# Patient Record
Sex: FEMALE | Race: WHITE | NOT HISPANIC OR LATINO | Employment: OTHER | URBAN - METROPOLITAN AREA
[De-identification: names, ages, dates, MRNs, and addresses within clinical notes are randomized per-mention and may not be internally consistent; named-entity substitution may affect disease eponyms.]

---

## 2018-10-28 ENCOUNTER — PREP FOR SURGERY (OUTPATIENT)
Dept: OTHER | Facility: HOSPITAL | Age: 63
End: 2018-10-28

## 2018-10-28 DIAGNOSIS — Z80.0 FH: COLON CANCER: ICD-10-CM

## 2018-10-28 DIAGNOSIS — K63.5 COLON POLYP: Primary | ICD-10-CM

## 2018-11-08 PROBLEM — K63.5 COLON POLYP: Status: ACTIVE | Noted: 2018-11-08

## 2018-11-08 PROBLEM — Z80.0 FH: COLON CANCER: Status: ACTIVE | Noted: 2018-11-08

## 2018-11-27 ENCOUNTER — APPOINTMENT (OUTPATIENT)
Dept: WOMENS IMAGING | Facility: HOSPITAL | Age: 63
End: 2018-11-27

## 2018-11-27 PROCEDURE — 77067 SCR MAMMO BI INCL CAD: CPT | Performed by: RADIOLOGY

## 2018-11-27 PROCEDURE — 77063 BREAST TOMOSYNTHESIS BI: CPT | Performed by: RADIOLOGY

## 2018-12-05 ENCOUNTER — APPOINTMENT (OUTPATIENT)
Dept: WOMENS IMAGING | Facility: HOSPITAL | Age: 63
End: 2018-12-05

## 2018-12-05 PROCEDURE — MDREVIEWSP: Performed by: RADIOLOGY

## 2018-12-05 PROCEDURE — 77065 DX MAMMO INCL CAD UNI: CPT | Performed by: RADIOLOGY

## 2018-12-05 PROCEDURE — G0279 TOMOSYNTHESIS, MAMMO: HCPCS | Performed by: RADIOLOGY

## 2018-12-10 ENCOUNTER — APPOINTMENT (OUTPATIENT)
Dept: WOMENS IMAGING | Facility: HOSPITAL | Age: 63
End: 2018-12-10

## 2018-12-10 PROCEDURE — 19081 BX BREAST 1ST LESION STRTCTC: CPT | Performed by: RADIOLOGY

## 2019-01-29 ENCOUNTER — ANESTHESIA EVENT (OUTPATIENT)
Dept: GASTROENTEROLOGY | Facility: HOSPITAL | Age: 64
End: 2019-01-29

## 2019-01-29 ENCOUNTER — HOSPITAL ENCOUNTER (OUTPATIENT)
Facility: HOSPITAL | Age: 64
Setting detail: HOSPITAL OUTPATIENT SURGERY
Discharge: HOME | End: 2019-01-29
Attending: INTERNAL MEDICINE | Admitting: INTERNAL MEDICINE

## 2019-01-29 ENCOUNTER — ANESTHESIA (OUTPATIENT)
Dept: GASTROENTEROLOGY | Facility: HOSPITAL | Age: 64
End: 2019-01-29

## 2019-01-29 VITALS
SYSTOLIC BLOOD PRESSURE: 131 MMHG | HEART RATE: 61 BPM | OXYGEN SATURATION: 98 % | RESPIRATION RATE: 18 BRPM | WEIGHT: 209.31 LBS | BODY MASS INDEX: 32.85 KG/M2 | DIASTOLIC BLOOD PRESSURE: 74 MMHG | HEIGHT: 67 IN | TEMPERATURE: 98.1 F

## 2019-01-29 DIAGNOSIS — K63.5 COLON POLYP: ICD-10-CM

## 2019-01-29 DIAGNOSIS — Z80.0 FH: COLON CANCER: ICD-10-CM

## 2019-01-29 PROCEDURE — 25010000002 PROPOFOL 10 MG/ML EMULSION: Performed by: ANESTHESIOLOGY

## 2019-01-29 PROCEDURE — 45380 COLONOSCOPY AND BIOPSY: CPT | Performed by: INTERNAL MEDICINE

## 2019-01-29 PROCEDURE — 88305 TISSUE EXAM BY PATHOLOGIST: CPT | Performed by: INTERNAL MEDICINE

## 2019-01-29 RX ORDER — PROPOFOL 10 MG/ML
VIAL (ML) INTRAVENOUS AS NEEDED
Status: DISCONTINUED | OUTPATIENT
Start: 2019-01-29 | End: 2019-01-29 | Stop reason: SURG

## 2019-01-29 RX ORDER — LIDOCAINE HYDROCHLORIDE 20 MG/ML
INJECTION, SOLUTION INFILTRATION; PERINEURAL AS NEEDED
Status: DISCONTINUED | OUTPATIENT
Start: 2019-01-29 | End: 2019-01-29 | Stop reason: SURG

## 2019-01-29 RX ORDER — SODIUM CHLORIDE 0.9 % (FLUSH) 0.9 %
3 SYRINGE (ML) INJECTION AS NEEDED
Status: DISCONTINUED | OUTPATIENT
Start: 2019-01-29 | End: 2019-01-29 | Stop reason: HOSPADM

## 2019-01-29 RX ORDER — LIDOCAINE HYDROCHLORIDE 10 MG/ML
0.5 INJECTION, SOLUTION INFILTRATION; PERINEURAL ONCE AS NEEDED
Status: DISCONTINUED | OUTPATIENT
Start: 2019-01-29 | End: 2019-01-29 | Stop reason: HOSPADM

## 2019-01-29 RX ORDER — SODIUM CHLORIDE, SODIUM LACTATE, POTASSIUM CHLORIDE, CALCIUM CHLORIDE 600; 310; 30; 20 MG/100ML; MG/100ML; MG/100ML; MG/100ML
1000 INJECTION, SOLUTION INTRAVENOUS CONTINUOUS
Status: DISCONTINUED | OUTPATIENT
Start: 2019-01-29 | End: 2019-01-29 | Stop reason: HOSPADM

## 2019-01-29 RX ORDER — PROPOFOL 10 MG/ML
VIAL (ML) INTRAVENOUS CONTINUOUS PRN
Status: DISCONTINUED | OUTPATIENT
Start: 2019-01-29 | End: 2019-01-29 | Stop reason: SURG

## 2019-01-29 RX ADMIN — PROPOFOL 100 MCG/KG/MIN: 10 INJECTION, EMULSION INTRAVENOUS at 12:39

## 2019-01-29 RX ADMIN — LIDOCAINE HYDROCHLORIDE 60 MG: 20 INJECTION, SOLUTION INFILTRATION; PERINEURAL at 12:39

## 2019-01-29 RX ADMIN — SODIUM CHLORIDE, POTASSIUM CHLORIDE, SODIUM LACTATE AND CALCIUM CHLORIDE 1000 ML: 600; 310; 30; 20 INJECTION, SOLUTION INTRAVENOUS at 12:25

## 2019-01-29 RX ADMIN — PROPOFOL 50 MG: 10 INJECTION, EMULSION INTRAVENOUS at 12:39

## 2019-01-29 RX ADMIN — SODIUM CHLORIDE, POTASSIUM CHLORIDE, SODIUM LACTATE AND CALCIUM CHLORIDE 1000 ML: 600; 310; 30; 20 INJECTION, SOLUTION INTRAVENOUS at 11:48

## 2019-01-29 NOTE — ANESTHESIA POSTPROCEDURE EVALUATION
Patient: Coleen Zapien    Procedure Summary     Date:  01/29/19 Room / Location:   [facility]    Anesthesia Start:  1237 Anesthesia Stop:  1304    Procedure:  COLONOSCOPY TO CECUM WITH POLYPECTOMIES (N/A ) Diagnosis:       Colon polyp      FH: colon cancer      (Colon polyp [K63.5])      (FH: colon cancer [Z80.0])    Surgeon:  Clive Castro MD Provider:  Kari Fu MD    Anesthesia Type:  MAC ASA Status:  3          Anesthesia Type: MAC  Last vitals  BP   149/83 (01/29/19 1134)   Temp   36.7 °C (98.1 °F) (01/29/19 1134)   Pulse   65 (01/29/19 1134)   Resp   16 (01/29/19 1134)     SpO2   98 % (01/29/19 1134)     Post Anesthesia Care and Evaluation    Patient location during evaluation: bedside  Patient participation: complete - patient participated  Level of consciousness: awake  Pain management: adequate  Airway patency: patent  Anesthetic complications: No anesthetic complications    Cardiovascular status: acceptable  Respiratory status: acceptable  Hydration status: acceptable

## 2019-01-29 NOTE — H&P
"[facility]  Pre Procedure History & Physical    Chief Complaint:   62 yo female whose mom had colon cancer. She has a h/o colonic adenomas. She last had a colonoscopy in 8/16 when a 4 mm polyp was removed that was a leiomyoma. Her brother had colon polyps.     Subjective     HPI:   63 y.o. female whose mom had colon cancer. She has a h/o colonic adenomas. She last had a colonoscopy in 8/16 when a 4 mm polyp was removed that was a leiomyoma. Her brother had colon polyps.         Past Medical History:   Past Medical History:   Diagnosis Date   • Abdominal pain     LEFT SIDE   • Abrasion     BILATERAL ARM, PT \"PICKS\" AT ARMS   • Anxiety and depression    • Arthritis    • Disease of thyroid gland    • History of kidney stones    • IBS (irritable bowel syndrome)    • Interstitial cystitis    • PVC (premature ventricular contraction)     BEEN ON MEDS FOR ONE YEAR   • Torn meniscus     RIGHT KNEE       Family History:  Family History   Problem Relation Age of Onset   • Colon cancer Mother    • Colon polyps Brother        Social History:   reports that  has never smoked. She does not have any smokeless tobacco history on file. She reports that she does not drink alcohol or use drugs.    Medications:   Medications Prior to Admission   Medication Sig Dispense Refill Last Dose   • BUPROPION HCL PO Take 300 mg by mouth daily.   1/28/2019 at Unknown time   • escitalopram (LEXAPRO) 20 MG tablet Take 20 mg by mouth daily.   1/28/2019 at Unknown time   • levothyroxine sodium (TIROSINT) 125 MCG capsule capsule Take 125 mcg by mouth daily.   1/28/2019 at Unknown time   • metoprolol tartrate (LOPRESSOR) 50 MG tablet Take 50 mg by mouth daily.   1/28/2019 at Unknown time       Allergies:  Flu virus vaccine    ROS:    Pertinent items are noted in HPI     Objective     Blood pressure 149/83, pulse 65, temperature 98.1 °F (36.7 °C), temperature source Oral, resp. rate 16, height 170.2 cm (67\"), weight 94.9 kg (209 " lb 5 oz), SpO2 98 %.    Physical Exam   Constitutional: Pt is oriented to person, place, and time and well-developed, well-nourished, and in no distress.   HENT:   Mouth/Throat: Oropharynx is clear and moist.   Neck: Normal range of motion. Neck supple.   Cardiovascular: Normal rate, regular rhythm and normal heart sounds.    Pulmonary/Chest: Effort normal and breath sounds normal. No respiratory distress. No  wheezes.   Abdominal: Soft. Bowel sounds are normal.   Skin: Skin is warm and dry.   Psychiatric: Mood, memory, affect and judgment normal.     Assessment/Plan     Diagnosis:  63 y.o. female whose mom had colon cancer. She has a h/o colonic adenomas. She last had a colonoscopy in 8/16 when a 4 mm polyp was removed that was a leiomyoma. Her brother had colon polyps.     Anticipated Surgical Procedure:  Colonoscopy    The risks, benefits, and alternatives of this procedure have been discussed with the patient or the responsible party- the patient understands and agrees to proceed.

## 2019-01-29 NOTE — ANESTHESIA PREPROCEDURE EVALUATION
Anesthesia Evaluation                  Airway   Mallampati: III  TM distance: >3 FB  Neck ROM: full  No difficulty expected  Dental - normal exam     Pulmonary - normal exam   Cardiovascular - normal exam    Patient on routine beta blocker    (+) dysrhythmias,       Neuro/Psych  GI/Hepatic/Renal/Endo    (+)   hypothyroidism,     Musculoskeletal     Abdominal    Substance History      OB/GYN          Other   (+) arthritis                     Anesthesia Plan    ASA 3     MAC     intravenous induction   Anesthetic plan, all risks, benefits, and alternatives have been provided, discussed and informed consent has been obtained with: patient.

## 2019-01-30 LAB
CYTO UR: NORMAL
LAB AP CASE REPORT: NORMAL
PATH REPORT.FINAL DX SPEC: NORMAL
PATH REPORT.GROSS SPEC: NORMAL

## 2019-02-08 ENCOUNTER — TELEPHONE (OUTPATIENT)
Dept: GASTROENTEROLOGY | Facility: CLINIC | Age: 64
End: 2019-02-08

## 2019-02-08 NOTE — TELEPHONE ENCOUNTER
----- Message from Carolina Barksdale sent at 2/8/2019  9:44 AM EST -----  Regarding: scope report   Contact: [phone]  Calling for c/s report

## 2019-02-11 ENCOUNTER — TELEPHONE (OUTPATIENT)
Dept: GASTROENTEROLOGY | Facility: CLINIC | Age: 64
End: 2019-02-11

## 2019-02-11 NOTE — TELEPHONE ENCOUNTER
Called pt and advised per Dr Castro that the colon polyps that were removed were not cancerous and not precancerous, which is good.  He recommends a repeat c/s in 3-4 yrs. Pt verb understanding.     C/s placed in recall for 01/29/2022.   (3) adequate

## 2019-02-11 NOTE — TELEPHONE ENCOUNTER
----- Message from Clive Castro MD sent at 2/8/2019  5:16 PM EST -----  Tell her that the colon polyps that were removed were not cancerous and not precancerous, which is good.  I would recommend that she have a repeat colonoscopy in 3-4 years.. Thx. kjh

## 2019-09-13 ENCOUNTER — APPOINTMENT (OUTPATIENT)
Dept: WOMENS IMAGING | Facility: HOSPITAL | Age: 64
End: 2019-09-13

## 2019-09-13 PROCEDURE — G0279 TOMOSYNTHESIS, MAMMO: HCPCS | Performed by: RADIOLOGY

## 2019-09-13 PROCEDURE — 77065 DX MAMMO INCL CAD UNI: CPT | Performed by: RADIOLOGY

## 2020-03-02 ENCOUNTER — TELEPHONE (OUTPATIENT)
Dept: GASTROENTEROLOGY | Facility: CLINIC | Age: 65
End: 2020-03-02

## 2020-03-02 NOTE — TELEPHONE ENCOUNTER
Please tell Ms. Zapien that I really couldn't give an opinion unless I were to see her sister, Kathy Hines, in the office. I am happy to see her if she wants. veronica

## 2020-03-02 NOTE — TELEPHONE ENCOUNTER
"Pt last seen 1/29/19.      Call to pt.  States is calling about her sister, Kathy Hines, 9/23/38. Has been having problems with hurting across chest, persistent cough.   Took otc antacid for a few weeks with resolution of symptoms.  Now returned.   Has seen [facility], and had mult testing done \"and no one can figure out what's going on\".  Asking if Dr Castro thinks could be gas.    Advise that Dr Castro has never seen pt - would not be able to offer assessment without being seen in office.  Declines appt - states \"just want to know if he thinks it could be GI related, if so - will make appt\".  Reiterate above.  Insistent to question Dr Castro - message sent.    "

## 2020-03-02 NOTE — TELEPHONE ENCOUNTER
----- Message from Dedra Gaspar sent at 3/2/2020 10:00 AM EST -----  Regarding: question  Contact: [phone]  Pt wants to talk with a nurse

## 2021-07-21 ENCOUNTER — OFFICE VISIT (OUTPATIENT)
Dept: ORTHOPEDIC SURGERY | Facility: CLINIC | Age: 66
End: 2021-07-21

## 2021-07-21 VITALS — HEIGHT: 67 IN | BODY MASS INDEX: 32.99 KG/M2 | TEMPERATURE: 96.8 F | WEIGHT: 210.2 LBS

## 2021-07-21 DIAGNOSIS — M25.561 RIGHT KNEE PAIN, UNSPECIFIED CHRONICITY: Primary | ICD-10-CM

## 2021-07-21 DIAGNOSIS — M51.36 DDD (DEGENERATIVE DISC DISEASE), LUMBAR: ICD-10-CM

## 2021-07-21 DIAGNOSIS — M17.11 ARTHRITIS OF RIGHT KNEE: ICD-10-CM

## 2021-07-21 PROCEDURE — 99204 OFFICE O/P NEW MOD 45 MIN: CPT | Performed by: ORTHOPAEDIC SURGERY

## 2021-07-21 PROCEDURE — 20610 DRAIN/INJ JOINT/BURSA W/O US: CPT | Performed by: ORTHOPAEDIC SURGERY

## 2021-07-21 PROCEDURE — 73562 X-RAY EXAM OF KNEE 3: CPT | Performed by: ORTHOPAEDIC SURGERY

## 2021-07-21 RX ORDER — BUPROPION HYDROCHLORIDE 300 MG/1
300 TABLET ORAL EVERY MORNING
COMMUNITY
Start: 2021-05-29

## 2021-07-21 RX ORDER — LEVOTHYROXINE SODIUM 100 MCG
100 TABLET ORAL DAILY
COMMUNITY
Start: 2021-07-10

## 2021-07-21 RX ORDER — NITROGLYCERIN 0.4 MG/1
0.4 TABLET SUBLINGUAL
COMMUNITY

## 2021-07-21 RX ORDER — FUROSEMIDE 20 MG/1
20 TABLET ORAL AS NEEDED
COMMUNITY

## 2021-07-21 RX ORDER — ATORVASTATIN CALCIUM 20 MG/1
20 TABLET, FILM COATED ORAL DAILY
COMMUNITY
Start: 2021-06-01

## 2021-07-21 RX ADMIN — LIDOCAINE HYDROCHLORIDE 4 ML: 10 INJECTION, SOLUTION EPIDURAL; INFILTRATION; INTRACAUDAL; PERINEURAL at 10:16

## 2021-07-21 RX ADMIN — METHYLPREDNISOLONE ACETATE 80 MG: 80 INJECTION, SUSPENSION INTRA-ARTICULAR; INTRALESIONAL; INTRAMUSCULAR; SOFT TISSUE at 10:16

## 2021-07-21 NOTE — PROGRESS NOTES
Patient Name: Coleen Zapien   YOB: 1955  Referring Primary Care Physician: TOM Stewart MD  BMI: Body mass index is 32.92 kg/m².    Chief Complaint:    Chief Complaint   Patient presents with   • Right Knee - Pain, Initial Evaluation        HPI:     Coleen Zapien is a 65 y.o. female who presents today for evaluation of   Chief Complaint   Patient presents with   • Right Knee - Pain, Initial Evaluation   . Ms. Zapien is seen today complaining of right leg pain. She is seeing somebody at [facility] for her back and she says she has pinched nerves that run down her leg. She is getting burning pain in the right medial thigh and some pain further down the leg and posteriorly in the knee, but not anteriorly in the knee. She has tried rest, ice, etc., and it is bothering her. It is not locking or catching and there is no trauma.    Subjective   Medications:   Home Medications:  Current Outpatient Medications on File Prior to Visit   Medication Sig   • atorvastatin (LIPITOR) 20 MG tablet    • buPROPion XL (WELLBUTRIN XL) 300 MG 24 hr tablet    • escitalopram (LEXAPRO) 20 MG tablet Take 20 mg by mouth daily.   • furosemide (LASIX) 20 MG tablet furosemide 20 mg tablet   • metoprolol tartrate (LOPRESSOR) 50 MG tablet Take 50 mg by mouth daily.   • nitroglycerin (NITROSTAT) 0.4 MG SL tablet nitroglycerin 0.4 mg sublingual tablet   • Synthroid 100 MCG tablet    • [DISCONTINUED] BUPROPION HCL PO Take 300 mg by mouth daily.   • [DISCONTINUED] levothyroxine sodium (TIROSINT) 125 MCG capsule capsule Take 125 mcg by mouth daily.     No current facility-administered medications on file prior to visit.     Current Medications:  Scheduled Meds:  Continuous Infusions:No current facility-administered medications for this visit.    PRN Meds:.    I have reviewed the patient's medical history in detail and updated the computerized patient record.  Review and summarization of old records includes:    Past Medical History:  "  Diagnosis Date   • Abdominal pain     LEFT SIDE   • Abrasion     BILATERAL ARM, PT \"PICKS\" AT ARMS   • Anxiety and depression    • Arthritis    • Disease of thyroid gland    • History of kidney stones    • IBS (irritable bowel syndrome)    • Interstitial cystitis    • PVC (premature ventricular contraction)     BEEN ON MEDS FOR ONE YEAR   • Torn meniscus     RIGHT KNEE        Past Surgical History:   Procedure Laterality Date   • COLONOSCOPY      2010   • COLONOSCOPY N/A 8/18/2016    Procedure: COLONOSCOPY INTO CECUM AND TI WITH COLD BIOPSY POLYPECTOMIES;  Surgeon: Clive Castro MD;  Location: [facility];  Service:    • COLONOSCOPY N/A 1/29/2019    Procedure: COLONOSCOPY TO CECUM WITH POLYPECTOMIES;  Surgeon: Clive Castro MD;  Location: [facility] ENDOSCOPY;  Service: Gastroenterology   • DIAGNOSTIC LAPAROSCOPY EXPLORATORY LAPAROTOMY     • EXTRACORPOREAL SHOCK WAVE LITHOTRIPSY (ESWL)          Social History     Occupational History   • Not on file   Tobacco Use   • Smoking status: Never Smoker   Substance and Sexual Activity   • Alcohol use: No   • Drug use: No   • Sexual activity: Not on file      Social History     Social History Narrative   • Not on file        Family History   Problem Relation Age of Onset   • Colon cancer Mother    • Colon polyps Brother        ROS: 14 point review of systems was performed and all other systems were reviewed and are negative except for documented findings in HPI and today's encounter.     Allergies:   Allergies   Allergen Reactions   • Bee Venom Anaphylaxis     Other reaction(s): Decreased blood pressure   • Flu Virus Vaccine Rash     SWELLING AND RASH   • Pneumococcal Vaccines Hives, Rash and Swelling     Constitutional:  Denies fever, shaking or chills   Eyes:  Denies change in visual acuity   HENT:  Denies nasal congestion or sore throat   Respiratory:  Denies cough or shortness of breath   Cardiovascular:  Denies chest pain or severe LE edema   GI:  Denies abdominal " "pain, nausea, vomiting, bloody stools or diarrhea   Musculoskeletal:  Numbness, tingling, pain, or loss of motor function only as noted above in history of present illness.  : Denies painful urination or hematuria  Integument:  Denies rash, lesion or ulceration   Neurologic:  Denies headache or focal weakness  Endocrine:  Denies lymphadenopathy  Psych:  Denies confusion or change in mental status   Hem:  Denies active bleeding    OBJECTIVE:  Physical Exam: 65 y.o. female  Wt Readings from Last 3 Encounters:   07/21/21 95.3 kg (210 lb 3.2 oz)   01/29/19 94.9 kg (209 lb 5 oz)   08/18/16 94.5 kg (208 lb 7 oz)     Ht Readings from Last 1 Encounters:   07/21/21 170.2 cm (67\")     Body mass index is 32.92 kg/m².  Vitals:    07/21/21 0946   Temp: 96.8 °F (36 °C)     Vital signs reviewed.     General Appearance:    Alert, cooperative, in no acute distress                  Eyes: conjunctiva clear  ENT: external ears and nose atraumatic  CV: no peripheral edema  Resp: normal respiratory effort  Skin: no rashes or wounds; normal turgor  Psych: mood and affect appropriate  Lymph: no nodes appreciated  Neuro: gross sensation intact  Vascular:  Palpable peripheral pulse in noted extremity  Musculoskeletal Extremities: Examination today shows a pleasant lady. She has joint line mild tenderness. It is more in the back where she has a Baker's cyst. She has a small Baker's cyst on the left as well. She has pretty good range of motion. Tricia's is negative. Rotation and axial loading of her hip is nontender and she is diffusely tender in her back.    Radiology:   Three views of the right knee, including AP, lateral, and 40-degree PA views, were obtained and reviewed in the office today for pain. Without comparison views, these demonstrated severe end-stage arthritis.    She had an MRI of the spine that showed a large Tarlov cyst and some mild stenosis and facet changes as well. This is reviewed that she brings in from " [facility].      Assessment:     ICD-10-CM ICD-9-CM   1. Right knee pain, unspecified chronicity  M25.561 719.46   2. Arthritis of right knee  M17.11 716.96   3. DDD (degenerative disc disease), lumbar  M51.36 722.52        MDM/Plan:   The diagnosis(es), natural history, pathophysiology and treatment for diagnosis(es) were discussed. Opportunity given and questions answered.  Biomechanics of pertinent body areas discussed.  When appropriate, the use of ambulatory aids discussed.    We discussed the patient's right knee pain. I have informed her that I think the pain is coming from her knee. It may be multifactorial, however, with the burning in her thigh coming from her back. We will see how well she does with the numbing phase and the anti-inflammation phase of corticosteroid injection and see how it helps. If it helps, she could repeat it at 3 months.    MEDICATIONS:  The risks, benefits, warnings,side effects and alternatives of medications discussed.  Inflammation/pain control; with cold, heat, elevation and/or liniments discussed as appropriate  Cortisone Injection. See procedure note.    Large Joint Arthrocentesis: R knee  Date/Time: 7/21/2021 10:16 AM  Consent given by: patient  Site marked: site marked  Timeout: Immediately prior to procedure a time out was called to verify the correct patient, procedure, equipment, support staff and site/side marked as required   Supporting Documentation  Indications: pain and joint swelling   Procedure Details  Location: knee - R knee  Preparation: Patient was prepped and draped in the usual sterile fashion  Needle size: 22 G  Approach: anterolateral  Medications administered: 80 mg methylPREDNISolone acetate 80 MG/ML; 4 mL lidocaine PF 1% 1 %  Patient tolerance: patient tolerated the procedure well with no immediate complications          7/21/2021    Scribed for Gonzales Villarreal MD by Jc Bell.  07/21/21   14:09 EDT    I have personally performed the services  described in this document as scribed by the above individual, and it is both accurate and complete.  Gonzales Villarreal MD  7/22/2021  11:46 EDT

## 2021-07-22 RX ORDER — METHYLPREDNISOLONE ACETATE 80 MG/ML
80 INJECTION, SUSPENSION INTRA-ARTICULAR; INTRALESIONAL; INTRAMUSCULAR; SOFT TISSUE
Status: COMPLETED | OUTPATIENT
Start: 2021-07-21 | End: 2021-07-21

## 2021-07-22 RX ORDER — LIDOCAINE HYDROCHLORIDE 10 MG/ML
4 INJECTION, SOLUTION EPIDURAL; INFILTRATION; INTRACAUDAL; PERINEURAL
Status: COMPLETED | OUTPATIENT
Start: 2021-07-21 | End: 2021-07-21

## 2021-10-21 ENCOUNTER — OFFICE VISIT (OUTPATIENT)
Dept: ORTHOPEDIC SURGERY | Facility: CLINIC | Age: 66
End: 2021-10-21

## 2021-10-21 VITALS — TEMPERATURE: 96.8 F | BODY MASS INDEX: 32.96 KG/M2 | HEIGHT: 67 IN | WEIGHT: 210 LBS

## 2021-10-21 DIAGNOSIS — M17.0 ARTHRITIS OF BOTH KNEES: Primary | ICD-10-CM

## 2021-10-21 DIAGNOSIS — M25.561 PAIN IN BOTH KNEES, UNSPECIFIED CHRONICITY: ICD-10-CM

## 2021-10-21 DIAGNOSIS — M25.562 PAIN IN BOTH KNEES, UNSPECIFIED CHRONICITY: ICD-10-CM

## 2021-10-21 PROCEDURE — 20610 DRAIN/INJ JOINT/BURSA W/O US: CPT | Performed by: ORTHOPAEDIC SURGERY

## 2021-10-21 PROCEDURE — 73562 X-RAY EXAM OF KNEE 3: CPT | Performed by: ORTHOPAEDIC SURGERY

## 2021-10-21 PROCEDURE — 99213 OFFICE O/P EST LOW 20 MIN: CPT | Performed by: ORTHOPAEDIC SURGERY

## 2021-10-21 RX ORDER — METHYLPREDNISOLONE ACETATE 80 MG/ML
80 INJECTION, SUSPENSION INTRA-ARTICULAR; INTRALESIONAL; INTRAMUSCULAR; SOFT TISSUE
Status: COMPLETED | OUTPATIENT
Start: 2021-10-21 | End: 2021-10-21

## 2021-10-21 RX ADMIN — METHYLPREDNISOLONE ACETATE 80 MG: 80 INJECTION, SUSPENSION INTRA-ARTICULAR; INTRALESIONAL; INTRAMUSCULAR; SOFT TISSUE at 10:44

## 2021-10-21 NOTE — PROGRESS NOTES
"Patient Name: Coleen Zapien   YOB: 1955  Referring Primary Care Physician: TOM Stewart MD  BMI: Body mass index is 32.89 kg/m².    Chief Complaint:    Chief Complaint   Patient presents with   • Left Knee - Pain        HPI:     Coleen Zapien is a 66 y.o. female who presents today for evaluation of   Chief Complaint   Patient presents with   • Left Knee - Pain   .  Patient was seen previously for right knee pain several months ago and had injections it was \"amazing\" that is largely better but starting a little worse she has a new problem of pain in the left knee she denies any trauma etc. swelling stiff and trouble getting up from a chair and initiating ambulation      Subjective   Medications:   Home Medications:  Current Outpatient Medications on File Prior to Visit   Medication Sig   • atorvastatin (LIPITOR) 20 MG tablet    • buPROPion XL (WELLBUTRIN XL) 300 MG 24 hr tablet    • escitalopram (LEXAPRO) 20 MG tablet Take 20 mg by mouth daily.   • furosemide (LASIX) 20 MG tablet furosemide 20 mg tablet   • metoprolol tartrate (LOPRESSOR) 50 MG tablet Take 50 mg by mouth daily.   • nitroglycerin (NITROSTAT) 0.4 MG SL tablet nitroglycerin 0.4 mg sublingual tablet   • Synthroid 100 MCG tablet      No current facility-administered medications on file prior to visit.     Current Medications:  Scheduled Meds:  Continuous Infusions:No current facility-administered medications for this visit.    PRN Meds:.    I have reviewed the patient's medical history in detail and updated the computerized patient record.  Review and summarization of old records includes:    Past Medical History:   Diagnosis Date   • Abdominal pain     LEFT SIDE   • Abrasion     BILATERAL ARM, PT \"PICKS\" AT ARMS   • Anxiety and depression    • Arthritis    • Disease of thyroid gland    • History of kidney stones    • IBS (irritable bowel syndrome)    • Interstitial cystitis    • PVC (premature ventricular contraction)     BEEN ON " MEDS FOR ONE YEAR   • Torn meniscus     RIGHT KNEE        Past Surgical History:   Procedure Laterality Date   • COLONOSCOPY      2010   • COLONOSCOPY N/A 8/18/2016    Procedure: COLONOSCOPY INTO CECUM AND TI WITH COLD BIOPSY POLYPECTOMIES;  Surgeon: Clive Castro MD;  Location: [facility] ENDOSCOPY;  Service:    • COLONOSCOPY N/A 1/29/2019    Procedure: COLONOSCOPY TO CECUM WITH POLYPECTOMIES;  Surgeon: Clive Castro MD;  Location: [facility] ENDOSCOPY;  Service: Gastroenterology   • DIAGNOSTIC LAPAROSCOPY EXPLORATORY LAPAROTOMY     • EXTRACORPOREAL SHOCK WAVE LITHOTRIPSY (ESWL)          Social History     Occupational History   • Not on file   Tobacco Use   • Smoking status: Never Smoker   • Smokeless tobacco: Not on file   Substance and Sexual Activity   • Alcohol use: No   • Drug use: No   • Sexual activity: Not on file      Social History     Social History Narrative   • Not on file        Family History   Problem Relation Age of Onset   • Colon cancer Mother    • Colon polyps Brother        ROS: 14 point review of systems was performed and all other systems were reviewed and are negative except for documented findings in HPI and today's encounter.     Allergies:   Allergies   Allergen Reactions   • Bee Venom Anaphylaxis     Other reaction(s): Decreased blood pressure   • Flu Virus Vaccine Rash     SWELLING AND RASH   • Pneumococcal Vaccines Hives, Rash and Swelling     Constitutional:  Denies fever, shaking or chills   Eyes:  Denies change in visual acuity   HENT:  Denies nasal congestion or sore throat   Respiratory:  Denies cough or shortness of breath   Cardiovascular:  Denies chest pain or severe LE edema   GI:  Denies abdominal pain, nausea, vomiting, bloody stools or diarrhea   Musculoskeletal:  Numbness, tingling, pain, or loss of motor function only as noted above in history of present illness.  : Denies painful urination or hematuria  Integument:  Denies rash, lesion or ulceration   Neurologic:  Denies  "headache or focal weakness  Endocrine:  Denies lymphadenopathy  Psych:  Denies confusion or change in mental status   Hem:  Denies active bleeding    OBJECTIVE:  Physical Exam: 66 y.o. female  Wt Readings from Last 3 Encounters:   10/21/21 95.3 kg (210 lb)   07/21/21 95.3 kg (210 lb 3.2 oz)   01/29/19 94.9 kg (209 lb 5 oz)     Ht Readings from Last 1 Encounters:   10/21/21 170.2 cm (67\")     Body mass index is 32.89 kg/m².  Vitals:    10/21/21 1033   Temp: 96.8 °F (36 °C)     Vital signs reviewed.     General Appearance:    Alert, cooperative, in no acute distress                  Eyes: conjunctiva clear  ENT: external ears and nose atraumatic  CV: no peripheral edema  Resp: normal respiratory effort  Skin: no rashes or wounds; normal turgor  Psych: mood and affect appropriate  Lymph: no nodes appreciated  Neuro: gross sensation intact  Vascular:  Palpable peripheral pulse in noted extremity  Musculoskeletal Extremities: Slight start up limp she has swelling bilateral knees with slight Baker's cyst swelling joint line tenderness and pretty good range of motion    Radiology:   AP lateral 40 degree PA x-ray bilateral knees taken the office today for complaints of pain with comparison view shows arthritis        Assessment:     ICD-10-CM ICD-9-CM   1. Pain in both knees, unspecified chronicity  M25.561 719.46    M25.562    2. Arthritis of both knees  M17.0 716.96        MDM/Plan:   The diagnosis(es), natural history, pathophysiology and treatment for diagnosis(es) were discussed. Opportunity given and questions answered.  Biomechanics of pertinent body areas discussed.  When appropriate, the use of ambulatory aids discussed.    BMI:  The concept of BMI body mass index and its importance and implications discussed.    MEDICATIONS:  The risks, benefits, warnings,side effects and alternatives of medications discussed.  Inflammation/pain control; with cold, heat, elevation and/or liniments discussed as " appropriate  Cortisone Injection. See procedure note.      10/21/2021    Dragon software used for dictation of this encounter.       Large Joint Arthrocentesis: R knee  Date/Time: 10/21/2021 10:44 AM  Consent given by: patient  Site marked: site marked  Timeout: Immediately prior to procedure a time out was called to verify the correct patient, procedure, equipment, support staff and site/side marked as required   Supporting Documentation  Indications: pain and joint swelling   Procedure Details  Location: knee - R knee  Preparation: Patient was prepped and draped in the usual sterile fashion  Needle size: 22 G  Approach: anterolateral  Medications administered: 80 mg methylPREDNISolone acetate 80 MG/ML; 4 mL lidocaine (cardiac)  Patient tolerance: patient tolerated the procedure well with no immediate complications    Large Joint Arthrocentesis: L knee  Date/Time: 10/21/2021 10:44 AM  Consent given by: patient  Site marked: site marked  Timeout: Immediately prior to procedure a time out was called to verify the correct patient, procedure, equipment, support staff and site/side marked as required   Supporting Documentation  Indications: pain and joint swelling   Procedure Details  Location: knee - L knee  Preparation: Patient was prepped and draped in the usual sterile fashion  Needle size: 22 G  Approach: anterolateral  Medications administered: 80 mg methylPREDNISolone acetate 80 MG/ML; 4 mL lidocaine (cardiac)  Patient tolerance: patient tolerated the procedure well with no immediate complications

## 2021-11-22 ENCOUNTER — TELEPHONE (OUTPATIENT)
Dept: ORTHOPEDIC SURGERY | Facility: CLINIC | Age: 66
End: 2021-11-22

## 2021-11-22 NOTE — TELEPHONE ENCOUNTER
Caller: ROSMERY LOCKETT    Relationship: SELF    Best call back number: [phone]    What is the best time to reach you: ANY    What was the call regarding: PATIENT RECEIVED A CORTISONE INJECTION IN HER RT KNEE ON 10/21/2021. SHE IS NOW HAVING PAIN THIS PAST WEEKEND. SHE WOULD LIKE TO KNOW WHAT SHE SHOULD DO. WHEN SHE IS WALKING SHE SAYS THE PAIN IS A 5. HER BAKERS CYST ON THAT KNEE IS ALSO CAUSING PAIN.     Do you require a callback: YES

## 2021-11-22 NOTE — TELEPHONE ENCOUNTER
Spoke with patient- the cortisone injection in her right knee helped for about 3-4 weeks and now it is swollen and painful again. No new injury/trauma, no mechanical symptoms. She is taking tylenol PRN. She denies any contraindications to NSAIDs. I reviewed aleve dosing with her and encouraged her to take regularly for 7-10 days and see if it helps. Instructed to take with food and precautions given. Also instructed to use ice, heat, liniments, etc for inflammation/pain control. Can repeat cortisone at 3 month intervals.

## 2021-11-30 ENCOUNTER — OFFICE VISIT (OUTPATIENT)
Dept: ORTHOPEDIC SURGERY | Facility: CLINIC | Age: 66
End: 2021-11-30

## 2021-11-30 VITALS — HEIGHT: 67 IN | WEIGHT: 210 LBS | TEMPERATURE: 97.5 F | BODY MASS INDEX: 32.96 KG/M2

## 2021-11-30 DIAGNOSIS — M17.0 ARTHRITIS OF BOTH KNEES: Primary | ICD-10-CM

## 2021-11-30 PROCEDURE — 99213 OFFICE O/P EST LOW 20 MIN: CPT | Performed by: NURSE PRACTITIONER

## 2021-11-30 RX ORDER — MELOXICAM 7.5 MG/1
7.5 TABLET ORAL DAILY
Qty: 90 TABLET | Refills: 0 | Status: SHIPPED | OUTPATIENT
Start: 2021-11-30 | End: 2021-12-22 | Stop reason: SINTOL

## 2021-11-30 NOTE — PROGRESS NOTES
Patient Name: Coleen Zapien   YOB: 1955  Referring Primary Care Physician: TOM Stewart MD  BMI: Body mass index is 32.89 kg/m².    Chief Complaint:    Chief Complaint   Patient presents with   • Right Knee - Follow-up        HPI:     Coleen Zapien is a 66 y.o. female who presents today for evaluation of   Chief Complaint   Patient presents with   • Right Knee - Follow-up   .  Patient presents for follow-up of right knee pain.  She has known history of osteoarthritis in the right knee and has a constant, aching pain in the knee.  She had a cortisone injection about 5 or 6 weeks ago and reports that it helped a lot for about 4 weeks but now her pain is returned and it seems to be even worse.  She has a lot of pain posteriorly and it radiates down into the medial calf.  She has been taking 1 over-the-counter Aleve twice a day as well as using ice, heat, liniments, etc. with minimal relief.  She denies any new injury or trauma to the knee.  There is no locking or catching or mechanical symptoms.  She has not done physical therapy on the knee.      Subjective   Medications:   Home Medications:  Current Outpatient Medications on File Prior to Visit   Medication Sig   • atorvastatin (LIPITOR) 20 MG tablet    • buPROPion XL (WELLBUTRIN XL) 300 MG 24 hr tablet    • escitalopram (LEXAPRO) 20 MG tablet Take 20 mg by mouth daily.   • furosemide (LASIX) 20 MG tablet furosemide 20 mg tablet   • metoprolol tartrate (LOPRESSOR) 50 MG tablet Take 50 mg by mouth daily.   • nitroglycerin (NITROSTAT) 0.4 MG SL tablet nitroglycerin 0.4 mg sublingual tablet   • Synthroid 100 MCG tablet      No current facility-administered medications on file prior to visit.     Current Medications:  Scheduled Meds:  Continuous Infusions:No current facility-administered medications for this visit.    PRN Meds:.    I have reviewed the patient's medical history in detail and updated the computerized patient record.  Review and  "summarization of old records includes:    Past Medical History:   Diagnosis Date   • Abdominal pain     LEFT SIDE   • Abrasion     BILATERAL ARM, PT \"PICKS\" AT ARMS   • Anxiety and depression    • Arthritis    • Disease of thyroid gland    • History of kidney stones    • IBS (irritable bowel syndrome)    • Interstitial cystitis    • PVC (premature ventricular contraction)     BEEN ON MEDS FOR ONE YEAR   • Torn meniscus     RIGHT KNEE        Past Surgical History:   Procedure Laterality Date   • COLONOSCOPY      2010   • COLONOSCOPY N/A 8/18/2016    Procedure: COLONOSCOPY INTO CECUM AND TI WITH COLD BIOPSY POLYPECTOMIES;  Surgeon: Clive Castro MD;  Location: [facility] ENDOSCOPY;  Service:    • COLONOSCOPY N/A 1/29/2019    Procedure: COLONOSCOPY TO CECUM WITH POLYPECTOMIES;  Surgeon: Clive Castro MD;  Location: [facility] ENDOSCOPY;  Service: Gastroenterology   • DIAGNOSTIC LAPAROSCOPY EXPLORATORY LAPAROTOMY     • EXTRACORPOREAL SHOCK WAVE LITHOTRIPSY (ESWL)          Social History     Occupational History   • Not on file   Tobacco Use   • Smoking status: Never Smoker   • Smokeless tobacco: Not on file   Substance and Sexual Activity   • Alcohol use: No   • Drug use: No   • Sexual activity: Not on file      Social History     Social History Narrative   • Not on file        Family History   Problem Relation Age of Onset   • Colon cancer Mother    • Colon polyps Brother        ROS: 14 point review of systems was performed and all other systems were reviewed and are negative except for documented findings in HPI and today's encounter.     Allergies:   Allergies   Allergen Reactions   • Bee Venom Anaphylaxis     Other reaction(s): Decreased blood pressure   • Flu Virus Vaccine Rash     SWELLING AND RASH   • Pneumococcal Vaccines Hives, Rash and Swelling     Constitutional:  Denies fever, shaking or chills   Eyes:  Denies change in visual acuity   HENT:  Denies nasal congestion or sore throat   Respiratory:  Denies cough or " "shortness of breath   Cardiovascular:  Denies chest pain or severe LE edema   GI:  Denies abdominal pain, nausea, vomiting, bloody stools or diarrhea   Musculoskeletal:  Numbness, tingling, pain, or loss of motor function only as noted above in history of present illness.  : Denies painful urination or hematuria  Integument:  Denies rash, lesion or ulceration   Neurologic:  Denies headache or focal weakness  Endocrine:  Denies lymphadenopathy  Psych:  Denies confusion or change in mental status   Hem:  Denies active bleeding    OBJECTIVE:  Physical Exam: 66 y.o. female  Wt Readings from Last 3 Encounters:   11/30/21 95.3 kg (210 lb)   10/21/21 95.3 kg (210 lb)   07/21/21 95.3 kg (210 lb 3.2 oz)     Ht Readings from Last 1 Encounters:   11/30/21 170.2 cm (67\")     Body mass index is 32.89 kg/m².  Vitals:    11/30/21 1321   Temp: 97.5 °F (36.4 °C)     Vital signs reviewed.     General Appearance:    Alert, cooperative, in no acute distress                  Eyes: conjunctiva clear  ENT: external ears and nose atraumatic  CV: no peripheral edema  Resp: normal respiratory effort  Skin: no rashes or wounds; normal turgor  Psych: mood and affect appropriate  Lymph: no nodes appreciated  Neuro: gross sensation intact  Vascular:  Palpable peripheral pulse in noted extremity  Musculoskeletal Extremities: The right knee has medial and lateral joint tenderness, swelling, synovitis, patellofemoral crepitation.  Tricia's is negative.  Lachman and posterior drawer negative.  No ligamentous instability.  Calf is soft and nontender with negative Homans' sign.  She ambulates well without assistive device    Radiology:   No x-rays obtained today.  AP, lateral, 40 degree PA of bilateral knees reviewed from 10/21/2021 show advanced, tricompartmental osteoarthritis of bilateral knees    Assessment:     ICD-10-CM ICD-9-CM   1. Arthritis of both knees  M17.0 716.96           MDM/Plan:   The diagnosis(es), natural history, " pathophysiology and treatment for diagnosis(es) were discussed. Opportunity given and questions answered.  Biomechanics of pertinent body areas discussed.  When appropriate, the use of ambulatory aids discussed.  BMI:  The concept of BMI body mass index and its importance and implications discussed.    EXERCISES:  Advice on benefits of, and types of regular/moderate exercise pertaining to orthopedic diagnosis(es).  MEDICATIONS:  The risks, benefits, warnings,side effects and alternatives of medications discussed.  Inflammation/pain control; with cold, heat, elevation and/or liniments discussed as appropriate  PT referral.  Went over treatment options for arthritis.  It is too soon to reinject the knee as it has only been 5 or 6 weeks since her last cortisone injection.  She has been taking over-the-counter Aleve.  We will start her on meloxicam 7.5 mg daily.  Precautions reviewed with her.  I encouraged her to continue using ice, heat, liniments, etc. for pain and inflammation control.  I placed a referral for physical therapy.  We can see her back in 5 or 6 weeks to inject the knee with cortisone.  We briefly discussed total knee arthroplasty if conservative measures fail.    11/30/2021    Much of this encounter note is an electronic transcription/translation of spoken language to printed text. The electronic translation of spoken language may permit erroneous, or at times, nonsensical words or phrases to be inadvertently transcribed; Although I have reviewed the note for such errors, some may still exist

## 2021-12-22 ENCOUNTER — OFFICE VISIT (OUTPATIENT)
Dept: ORTHOPEDIC SURGERY | Facility: CLINIC | Age: 66
End: 2021-12-22

## 2021-12-22 VITALS — WEIGHT: 210 LBS | TEMPERATURE: 97.8 F | HEIGHT: 67 IN | BODY MASS INDEX: 32.96 KG/M2

## 2021-12-22 DIAGNOSIS — M17.11 ARTHRITIS OF RIGHT KNEE: Primary | ICD-10-CM

## 2021-12-22 PROCEDURE — 99213 OFFICE O/P EST LOW 20 MIN: CPT | Performed by: NURSE PRACTITIONER

## 2021-12-22 RX ORDER — METHYLPREDNISOLONE 4 MG/1
TABLET ORAL
Qty: 21 TABLET | Refills: 0 | Status: SHIPPED | OUTPATIENT
Start: 2021-12-22 | End: 2022-03-07

## 2021-12-22 NOTE — PROGRESS NOTES
Patient Name: Coleen Zapien   YOB: 1955  Referring Primary Care Physician: TOM Stewart MD  BMI: Body mass index is 32.88 kg/m².    Chief Complaint:    Chief Complaint   Patient presents with   • Right Knee - Follow-up        HPI:     Coleen Zapien is a 66 y.o. female who presents today for evaluation of   Chief Complaint   Patient presents with   • Right Knee - Follow-up   .  Patient follows up today on her right knee pain.  She has history of osteoarthritis in the knee and has an achy pain throughout the whole knee with swelling and stiffness.  She had cortisone injection about 2 months ago which only helped for a few weeks.  We started her on meloxicam last month and she reports that it caused her brain to feel funny so she discontinued it.  Her primary care physician prescribed her naproxen and she is been taking 500 mg daily.  She did do some physical therapy and is continuing at home program but it does sometimes exacerbate her pain.  She does try to stay moderately active by walking and riding bike.  She denies any new injury or trauma.  There is no locking or catching.      Subjective   Medications:   Home Medications:  Current Outpatient Medications on File Prior to Visit   Medication Sig   • atorvastatin (LIPITOR) 20 MG tablet    • buPROPion XL (WELLBUTRIN XL) 300 MG 24 hr tablet    • escitalopram (LEXAPRO) 20 MG tablet Take 20 mg by mouth daily.   • furosemide (LASIX) 20 MG tablet furosemide 20 mg tablet   • metoprolol tartrate (LOPRESSOR) 50 MG tablet Take 50 mg by mouth daily.   • nitroglycerin (NITROSTAT) 0.4 MG SL tablet nitroglycerin 0.4 mg sublingual tablet   • Synthroid 100 MCG tablet    • [DISCONTINUED] meloxicam (Mobic) 7.5 MG tablet Take 1 tablet by mouth Daily.     No current facility-administered medications on file prior to visit.     Current Medications:  Scheduled Meds:  Continuous Infusions:No current facility-administered medications for this visit.    PRN  "Meds:.    I have reviewed the patient's medical history in detail and updated the computerized patient record.  Review and summarization of old records includes:    Past Medical History:   Diagnosis Date   • Abdominal pain     LEFT SIDE   • Abrasion     BILATERAL ARM, PT \"PICKS\" AT ARMS   • Anxiety and depression    • Arthritis    • Disease of thyroid gland    • History of kidney stones    • IBS (irritable bowel syndrome)    • Interstitial cystitis    • PVC (premature ventricular contraction)     BEEN ON MEDS FOR ONE YEAR   • Torn meniscus     RIGHT KNEE        Past Surgical History:   Procedure Laterality Date   • COLONOSCOPY      2010   • COLONOSCOPY N/A 8/18/2016    Procedure: COLONOSCOPY INTO CECUM AND TI WITH COLD BIOPSY POLYPECTOMIES;  Surgeon: Clive Castro MD;  Location: [facility] ENDOSCOPY;  Service:    • COLONOSCOPY N/A 1/29/2019    Procedure: COLONOSCOPY TO CECUM WITH POLYPECTOMIES;  Surgeon: Clive Castro MD;  Location: [facility] ENDOSCOPY;  Service: Gastroenterology   • DIAGNOSTIC LAPAROSCOPY EXPLORATORY LAPAROTOMY     • EXTRACORPOREAL SHOCK WAVE LITHOTRIPSY (ESWL)          Social History     Occupational History   • Not on file   Tobacco Use   • Smoking status: Never Smoker   • Smokeless tobacco: Not on file   Substance and Sexual Activity   • Alcohol use: No   • Drug use: No   • Sexual activity: Not on file      Social History     Social History Narrative   • Not on file        Family History   Problem Relation Age of Onset   • Colon cancer Mother    • Colon polyps Brother        ROS: 14 point review of systems was performed and all other systems were reviewed and are negative except for documented findings in HPI and today's encounter.     Allergies:   Allergies   Allergen Reactions   • Bee Venom Anaphylaxis     Other reaction(s): Decreased blood pressure   • Flu Virus Vaccine Rash     SWELLING AND RASH   • Pneumococcal Vaccines Hives, Rash and Swelling     Constitutional:  Denies fever, shaking or chills " "  Eyes:  Denies change in visual acuity   HENT:  Denies nasal congestion or sore throat   Respiratory:  Denies cough or shortness of breath   Cardiovascular:  Denies chest pain or severe LE edema   GI:  Denies abdominal pain, nausea, vomiting, bloody stools or diarrhea   Musculoskeletal:  Numbness, tingling, pain, or loss of motor function only as noted above in history of present illness.  : Denies painful urination or hematuria  Integument:  Denies rash, lesion or ulceration   Neurologic:  Denies headache or focal weakness  Endocrine:  Denies lymphadenopathy  Psych:  Denies confusion or change in mental status   Hem:  Denies active bleeding    OBJECTIVE:  Physical Exam: 66 y.o. female  Wt Readings from Last 3 Encounters:   12/22/21 95.3 kg (210 lb)   11/30/21 95.3 kg (210 lb)   10/21/21 95.3 kg (210 lb)     Ht Readings from Last 1 Encounters:   12/22/21 170.2 cm (67.01\")     Body mass index is 32.88 kg/m².  Vitals:    12/22/21 1345   Temp: 97.8 °F (36.6 °C)     Vital signs reviewed.     General Appearance:    Alert, cooperative, in no acute distress                  Eyes: conjunctiva clear  ENT: external ears and nose atraumatic  CV: no peripheral edema  Resp: normal respiratory effort  Skin: no rashes or wounds; normal turgor  Psych: mood and affect appropriate  Lymph: no nodes appreciated  Neuro: gross sensation intact  Vascular:  Palpable peripheral pulse in noted extremity  Musculoskeletal Extremities: Right knee has medial lateral joint tenderness, swelling, synovitis, patellofemoral crepitation.  Calf is soft nontender.  No ligamentous instability    Radiology:   No x-rays today.  Reviewed previous x-rays which show advanced tricompartment osteoarthritis of bilateral knees with osteophyte formation    Assessment:     ICD-10-CM ICD-9-CM   1. Arthritis of right knee  M17.11 716.96           MDM/Plan:   Discussed with patient that it still too soon to inject her with cortisone as she just had an injection " about 2 months ago.  We could bring her back in about a month and reinject with cortisone if she wants.  I discussed with her that she could increase her naproxen to 500 mg twice a day.  She can continue using ice, heat, liniments etc.  I encouraged her to continue a gentle home therapy program.  We can try Medrol Dosepak.  She is not diabetic and has no contraindications to it.  I told her to hold the naproxen while she is taking the Medrol Dosepak and then she can restart it after.  We will see her back as needed      12/22/2021    Much of this encounter note is an electronic transcription/translation of spoken language to printed text. The electronic translation of spoken language may permit erroneous, or at times, nonsensical words or phrases to be inadvertently transcribed; Although I have reviewed the note for such errors, some may still exist

## 2021-12-23 ENCOUNTER — TELEPHONE (OUTPATIENT)
Dept: ORTHOPEDIC SURGERY | Facility: CLINIC | Age: 66
End: 2021-12-23

## 2021-12-23 NOTE — TELEPHONE ENCOUNTER
Provider: CHEVY DAVIS    Caller: [phone]    Relationship to Patient: SELF    Pharmacy: [facility]- [phone]    Phone Number:[phone]     Reason for Call: PT. SAW CHEVY YESTERDAY- HER PRESCRIPTION DID NOT GO THROUGH TO [facility].   THEY DO NOT HAVE ANY RECORD OF THIS.   ASKING IF CHEVY CAN RESEND PRESCRIPTION PLEASE.   *

## 2021-12-27 ENCOUNTER — APPOINTMENT (OUTPATIENT)
Dept: WOMENS IMAGING | Facility: HOSPITAL | Age: 66
End: 2021-12-27

## 2021-12-27 PROCEDURE — 77067 SCR MAMMO BI INCL CAD: CPT | Performed by: RADIOLOGY

## 2021-12-27 PROCEDURE — 77063 BREAST TOMOSYNTHESIS BI: CPT | Performed by: RADIOLOGY

## 2022-01-17 ENCOUNTER — TELEPHONE (OUTPATIENT)
Dept: ORTHOPEDIC SURGERY | Facility: CLINIC | Age: 67
End: 2022-01-17

## 2022-01-17 NOTE — TELEPHONE ENCOUNTER
Caller: Coleen Zapien    Relationship to patient: Self    Best call back number: [phone]    Patient is needing: PATIENT HAS APPT 1/16 FOR KNEE INJECTION, SHE WOULD LIKE TO MOVE THAT UP SOONER IF THERE ARE ANY OPENINGS.

## 2022-01-25 ENCOUNTER — TELEPHONE (OUTPATIENT)
Dept: ORTHOPEDIC SURGERY | Facility: CLINIC | Age: 67
End: 2022-01-25

## 2022-01-25 NOTE — TELEPHONE ENCOUNTER
Provider: RYAN  Caller: ROSMERY LOCKETT  Relationship to Patient:SELF    Phone Number: [phone]  Reason for Call: PATIENT IS SCHEDULED TOMORROW 01 26 22 FOR INJ & TESTED POSITIVE FOR COVID - NEEDS CALL BACK TO R/S, AS [facility] ISN'T ABLE TO SCHEDULE OR R/S INJ.  PATIENT WAS ADVISED, SOMEONE WOULD CALL HER BACK BY END OF THE DAY - SENT AS HIGH PRIORITY DUE TO APPTMT WAS TOMORROW.

## 2022-02-01 ENCOUNTER — CLINICAL SUPPORT (OUTPATIENT)
Dept: ORTHOPEDIC SURGERY | Facility: CLINIC | Age: 67
End: 2022-02-01

## 2022-02-01 VITALS — TEMPERATURE: 98 F | HEIGHT: 67 IN | WEIGHT: 210 LBS | BODY MASS INDEX: 32.96 KG/M2

## 2022-02-01 DIAGNOSIS — M17.11 ARTHRITIS OF RIGHT KNEE: Primary | ICD-10-CM

## 2022-02-01 PROCEDURE — 20610 DRAIN/INJ JOINT/BURSA W/O US: CPT | Performed by: NURSE PRACTITIONER

## 2022-02-01 RX ORDER — METHYLPREDNISOLONE ACETATE 80 MG/ML
80 INJECTION, SUSPENSION INTRA-ARTICULAR; INTRALESIONAL; INTRAMUSCULAR; SOFT TISSUE
Status: COMPLETED | OUTPATIENT
Start: 2022-02-01 | End: 2022-02-01

## 2022-02-01 RX ADMIN — METHYLPREDNISOLONE ACETATE 80 MG: 80 INJECTION, SUSPENSION INTRA-ARTICULAR; INTRALESIONAL; INTRAMUSCULAR; SOFT TISSUE at 15:46

## 2022-02-01 NOTE — PROGRESS NOTES
2/1/2022    Coleen Zapien is here today for worsening knee pain. Pt has undergone injection of the knee in the past with good resolution of symptoms. Pt is requesting a repeat injection.     KNEE Injection Procedure Note:    Large Joint Arthrocentesis: R knee  Date/Time: 2/1/2022 3:46 PM  Consent given by: patient  Site marked: site marked  Timeout: Immediately prior to procedure a time out was called to verify the correct patient, procedure, equipment, support staff and site/side marked as required   Supporting Documentation  Indications: pain   Procedure Details  Location: knee - R knee  Preparation: Patient was prepped and draped in the usual sterile fashion  Needle gauge: 21G.  Approach: anterolateral  Medications administered: 4 mL lidocaine (cardiac); 80 mg methylPREDNISolone acetate 80 MG/ML  Patient tolerance: patient tolerated the procedure well with no immediate complications      At the conclusion of the injection I discussed the importance of continued quad strengthening exercises on a daily basis. I will see the patient back if the symptoms should fail to improve or worsen.    Brinda Thapa, APRN  2/1/2022

## 2022-03-07 ENCOUNTER — TELEPHONE (OUTPATIENT)
Dept: ORTHOPEDIC SURGERY | Facility: CLINIC | Age: 67
End: 2022-03-07

## 2022-03-07 RX ORDER — METHYLPREDNISOLONE 4 MG/1
TABLET ORAL
Qty: 21 TABLET | Refills: 0 | Status: SHIPPED | OUTPATIENT
Start: 2022-03-07 | End: 2022-05-04

## 2022-03-07 NOTE — TELEPHONE ENCOUNTER
Patient called today and she had a cortisone injection about 5 weeks ago in her knee.  She has severe arthritis in the knee.  She is asking if I recommend aspirating her Baker's cyst perhaps injecting that.  I explained to her is essentially connected to the joint and she just had an injection would not recommend.  I will send her in a Medrol Dosepak and she should stop taking anti-inflammatories with it see if this gets it under control.  We will see her back with x-rays but if the x-ray is getting severe enough and the not responding to injections may need to consider the possibility of surgery in the future.

## 2022-03-07 NOTE — TELEPHONE ENCOUNTER
Caller:  ROSMERY LOCKETT    Relationship to patient: SELF    Best call back number: [phone]    Chief complaint: RT KNEE / BAKER'S CYST / ASPIRATION    Type of visit: F/U     Additional notes: PT WOULD LIKE A CALL BACK TO DISCUSS ASPIRATION OF RT KNEE / RAMSEY'S CYST

## 2022-04-19 ENCOUNTER — TELEPHONE (OUTPATIENT)
Dept: ORTHOPEDIC SURGERY | Facility: CLINIC | Age: 67
End: 2022-04-19

## 2022-04-19 NOTE — TELEPHONE ENCOUNTER
Caller: ROSMERY LOCKETT     Relationship to patient: SELF    Best call back number:[phone]    Chief complaint: RIGHT KNEE PAIN     Type of visit: FUP INJECTION     Requested date: 4/22    Additional notes:PATIENT HAS AN APPT FOR 5/4 TO GET AN INJECTION WITH [facility].  SHE WOULD LIKE TO TRY AND GET A [place] COMB INJECTION AT THE 5/4 APPT IF POSSIBLE.  PLEASE CONTACT PATIENT AND LET HER KNOW.  SHE ALSO CANCELLED HER 4/22 APPT

## 2022-04-20 ENCOUNTER — PRE-PROCEDURE SCREENING (OUTPATIENT)
Dept: GASTROENTEROLOGY | Facility: CLINIC | Age: 67
End: 2022-04-20

## 2022-04-20 NOTE — TELEPHONE ENCOUNTER
Per KERRY:  I would do the cortisone when it is due in May and then discuss the visco gel injections and get preapproval from insurance. If the cortisone helps to relieve pain, I would get that in May and can discuss gel 6 weeks later. kerry     Spoke with pt and she was informed of KERRY's recommendation.  She voiced understanding and will follow up scheduled for a steroid injection.

## 2022-05-04 ENCOUNTER — OFFICE VISIT (OUTPATIENT)
Dept: ORTHOPEDIC SURGERY | Facility: CLINIC | Age: 67
End: 2022-05-04

## 2022-05-04 VITALS — BODY MASS INDEX: 32.65 KG/M2 | TEMPERATURE: 96.6 F | RESPIRATION RATE: 18 BRPM | WEIGHT: 208 LBS | HEIGHT: 67 IN

## 2022-05-04 DIAGNOSIS — M25.561 RIGHT KNEE PAIN, UNSPECIFIED CHRONICITY: Primary | ICD-10-CM

## 2022-05-04 DIAGNOSIS — M17.11 PRIMARY OSTEOARTHRITIS OF RIGHT KNEE: ICD-10-CM

## 2022-05-04 PROCEDURE — 99213 OFFICE O/P EST LOW 20 MIN: CPT | Performed by: NURSE PRACTITIONER

## 2022-05-04 PROCEDURE — 73562 X-RAY EXAM OF KNEE 3: CPT | Performed by: NURSE PRACTITIONER

## 2022-05-04 NOTE — PROGRESS NOTES
Patient: Coleen Zapien  YOB: 1955  Date of Service: 5/4/2022    Chief Complaints:   Chief Complaint   Patient presents with   • Right Knee - Follow-up, Pain       Subjective: Here for right knee  Pain and desires conservative treatment. Would like gel in future    History of Present Illness: Pt is seen in the office today with complaints of   Chief Complaint   Patient presents with   • Right Knee - Follow-up, Pain   .  KNEE: TIMING:  The pain is described as ACUTE.  LOCATION: medial joint line tenderness. AGGRAVATING FACTORS:  Is worsened by prolonged standing, sitting, walking and squatting activities.  ASSOCIATED SYMPTOMS:  swelling, tenderness, and aching.    This problem is new to this examiner.     Allergies:   Allergies   Allergen Reactions   • Bee Venom Anaphylaxis     Other reaction(s): Decreased blood pressure   • Influenza Virus Vaccine Rash     SWELLING AND RASH   • Pneumococcal Vaccines Hives, Rash and Swelling       Medications:   Home Medications:  Current Outpatient Medications on File Prior to Visit   Medication Sig   • atorvastatin (LIPITOR) 20 MG tablet    • buPROPion XL (WELLBUTRIN XL) 300 MG 24 hr tablet    • escitalopram (LEXAPRO) 20 MG tablet Take 20 mg by mouth daily.   • furosemide (LASIX) 20 MG tablet furosemide 20 mg tablet   • metoprolol tartrate (LOPRESSOR) 50 MG tablet Take 50 mg by mouth daily.   • nitroglycerin (NITROSTAT) 0.4 MG SL tablet nitroglycerin 0.4 mg sublingual tablet   • Synthroid 100 MCG tablet    • [DISCONTINUED] methylPREDNISolone (MEDROL) 4 MG dose pack Take as directed on package instructions.     No current facility-administered medications on file prior to visit.     Current Medications:  Scheduled Meds:  Continuous Infusions:No current facility-administered medications for this visit.    PRN Meds:.    I have reviewed the patient's medical history in detail and updated the computerized patient record.  Review and summarization of old records include:   "  Past Medical History:   Diagnosis Date   • Abdominal pain     LEFT SIDE   • Abrasion     BILATERAL ARM, PT \"PICKS\" AT ARMS   • Anxiety and depression    • Arthritis    • Disease of thyroid gland    • History of kidney stones    • IBS (irritable bowel syndrome)    • Interstitial cystitis    • PVC (premature ventricular contraction)     BEEN ON MEDS FOR ONE YEAR   • Torn meniscus     RIGHT KNEE        Past Surgical History:   Procedure Laterality Date   • COLONOSCOPY      2010   • COLONOSCOPY N/A 8/18/2016    Procedure: COLONOSCOPY INTO CECUM AND TI WITH COLD BIOPSY POLYPECTOMIES;  Surgeon: Clive Castro MD;  Location: [facility] ENDOSCOPY;  Service:    • COLONOSCOPY N/A 1/29/2019    Procedure: COLONOSCOPY TO CECUM WITH POLYPECTOMIES;  Surgeon: Clive Castro MD;  Location: [facility] ENDOSCOPY;  Service: Gastroenterology   • DIAGNOSTIC LAPAROSCOPY EXPLORATORY LAPAROTOMY     • EXTRACORPOREAL SHOCK WAVE LITHOTRIPSY (ESWL)          Social History     Occupational History   • Not on file   Tobacco Use   • Smoking status: Never Smoker   • Smokeless tobacco: Never Used   Vaping Use   • Vaping Use: Never used   Substance and Sexual Activity   • Alcohol use: No   • Drug use: No   • Sexual activity: Not on file      Social History     Social History Narrative   • Not on file        Family History   Problem Relation Age of Onset   • Colon cancer Mother    • Colon polyps Brother        ROS: 14 point review of systems was performed and was negative except for documented findings in HPI and today's encounter.     Allergies:   Allergies   Allergen Reactions   • Bee Venom Anaphylaxis     Other reaction(s): Decreased blood pressure   • Influenza Virus Vaccine Rash     SWELLING AND RASH   • Pneumococcal Vaccines Hives, Rash and Swelling     Constitutional:  Denies fever, shaking or chills   Eyes:  Denies change in visual acuity   HENT:  Denies nasal congestion or sore throat   Respiratory:  Denies cough or shortness of breath "   Cardiovascular:  Denies chest pain or severe LE edema   GI:  Denies abdominal pain, nausea, vomiting, bloody stools or diarrhea   Musculoskeletal:  Numbness, tingling, or loss of motor function only as noted above in history of present illness.  : Denies painful urination or hematuria  Integument:  Denies rash, lesion or ulceration   Neurologic:  Denies headache or focal weakness  Endocrine:  Denies lymphadenopathy  Psych:  Denies confusion or change in mental status   Hem:  Denies active bleeding      Physical Exam: 66 y.o. female  Wt Readings from Last 3 Encounters:   05/04/22 94.3 kg (208 lb)   02/01/22 95.3 kg (210 lb)   12/22/21 95.3 kg (210 lb)     Body mass index is 32.58 kg/m².  Facility age limit for growth percentiles is 20 years.  Vitals:    05/04/22 1517   Resp: 18   Temp: 96.6 °F (35.9 °C)     Vital signs reviewed.   General Appearance:    Alert, cooperative, in no acute distress                  Eyes: conjunctiva clear  ENT: external ears and nose atraumatic  CV: no peripheral edema  Resp: normal respiratory effort  Skin: no rashes or wounds; normal turgor  Psych: mood and affect appropriate  Lymph: no nodes appreciated  Neuro: gross sensation intact  Vascular:  Palpable peripheral pulse in noted extremity  Musculoskeletal Extremities: KNEE Exam: medial joint line tenderness with crepitation, synovitis, swelling, and joint effusion right knee.      Diagnostic Data:  Imaging done today and discussed with the patient:    Indication: pain related symptoms,  Views: 3V AP, LAT & 40 degree PA right knee(s)   Findings: severe end-stage arthritis (bone on bone, subchondral sclerosis/cysts, osteophytes)  Comparison views: viewed last xray done in the office.      Procedure:  Procedures    Assessment:     ICD-10-CM ICD-9-CM   1. Right knee pain, unspecified chronicity  M25.561 719.46   2. Primary osteoarthritis of right knee  M17.11 715.16       Plan:   Follow up as indicated.  Ice, elevate, and rest as  needed.  The diagnosis(es), natural history, pathophysiology and treatment for diagnosis(es) were discussed. Opportunity given and questions answered.  Biomechanics of pertinent body areas discussed.  When appropriate, the use of ambulatory aids discussed.  BMI:  The concept of BMI body mass index and its importance and implications discussed.    EXERCISES:  Advice on benefits of, and types of regular/moderate exercise pertaining to orthopedic diagnosis(es).  MEDICATIONS:  The risks, benefits, warnings,side effects and alternatives of medications discussed.  Inflammation/pain control; with cold, heat, elevation and/or liniments discussed as appropriate  Cortisone Injection. See procedure note.    5/4/2022

## 2022-05-05 ENCOUNTER — TELEPHONE (OUTPATIENT)
Dept: ORTHOPEDIC SURGERY | Facility: CLINIC | Age: 67
End: 2022-05-05

## 2022-05-05 NOTE — TELEPHONE ENCOUNTER
Caller: ROSMERY LOCKETT     Relationship: SELF     Best call back number: [phone]     What is the best time to reach you: ANY     Who are you requesting to speak with (clinical staff, provider,  specific staff member): BRODERICK FAJARDO     Do you know the name of the person who called: YES     What was the call regarding: PATIENT STATED THAT THE RIGHT KNEE, IS IN A LOT OF PAIN AND THE PATIENT IS WONDERING WHAT SHE CAN DO TO HELP

## 2022-07-06 ENCOUNTER — OFFICE VISIT (OUTPATIENT)
Dept: GASTROENTEROLOGY | Facility: CLINIC | Age: 67
End: 2022-07-06

## 2022-07-06 VITALS
HEART RATE: 63 BPM | SYSTOLIC BLOOD PRESSURE: 167 MMHG | HEIGHT: 67 IN | BODY MASS INDEX: 32.9 KG/M2 | DIASTOLIC BLOOD PRESSURE: 82 MMHG | WEIGHT: 209.6 LBS | TEMPERATURE: 96.3 F

## 2022-07-06 DIAGNOSIS — R19.4 CHANGE IN BOWEL HABITS: Primary | ICD-10-CM

## 2022-07-06 DIAGNOSIS — Z83.71 FH: COLON POLYPS: ICD-10-CM

## 2022-07-06 DIAGNOSIS — Z80.0 FH: COLON CANCER: ICD-10-CM

## 2022-07-06 DIAGNOSIS — R11.0 NAUSEA: ICD-10-CM

## 2022-07-06 DIAGNOSIS — D12.6 ADENOMATOUS POLYP OF COLON, UNSPECIFIED PART OF COLON: ICD-10-CM

## 2022-07-06 PROCEDURE — 99214 OFFICE O/P EST MOD 30 MIN: CPT | Performed by: NURSE PRACTITIONER

## 2022-07-06 NOTE — PROGRESS NOTES
"Chief Complaint   Patient presents with   • Abdominal Pain   • Rectal Pain       Coleen Zapien is a 66 y.o. female who presents with change in bowel habits.    HPI  66-year-old female presents today with change in bowel habits.  She is a patient of Dr. Castro.  She was last seen by him in January 2019 for screening colonoscopy.  She is new to me today.  She has a history of adenomatous colon polyps and a family history of colon cancer with her mother and colon polyps with her brother.  So she normally gets a screening colonoscopy every 2 to 3 years.  Her last screening colonoscopy was on 1/29/2019.  At that time she did have multiple hyperplastic polyps found.  She tells me in the last several months that she has had a change in bowel habits.  She does struggle with constipation and diarrhea but admits that her stools have changed.  They are more ribbonlike.  She tells me they can be thin and flat.  She denies any dysphagia, reflux, vomiting, rectal bleeding or melena.  She has had a lot of nausea lately.  She does not believe she has ever had an EGD before.  She did start taking daily Metamucil and thinks that is helping a little bit.  She is just quite concerned with these change in bowel habits given her extensive family history.  She tells me her appetite is good and her weight appears stable.  Past Medical History:   Diagnosis Date   • Abdominal pain     LEFT SIDE   • Abrasion     BILATERAL ARM, PT \"PICKS\" AT ARMS   • Anxiety and depression    • Arthritis    • Disease of thyroid gland    • History of kidney stones    • IBS (irritable bowel syndrome)    • Interstitial cystitis    • PVC (premature ventricular contraction)     BEEN ON MEDS FOR ONE YEAR   • Torn meniscus     RIGHT KNEE       Past Surgical History:   Procedure Laterality Date   • COLONOSCOPY      2010   • COLONOSCOPY N/A 8/18/2016    Procedure: COLONOSCOPY INTO CECUM AND TI WITH COLD BIOPSY POLYPECTOMIES;  Surgeon: Clive Castro MD;  Location: [facility];  Service:    • COLONOSCOPY N/A 1/29/2019    Procedure: COLONOSCOPY TO CECUM WITH POLYPECTOMIES;  Surgeon: Clive Castro MD;  Location: [facility] ENDOSCOPY;  Service: Gastroenterology   • DIAGNOSTIC LAPAROSCOPY EXPLORATORY LAPAROTOMY     • EXTRACORPOREAL SHOCK WAVE LITHOTRIPSY (ESWL)           Current Outpatient Medications:   •  atorvastatin (LIPITOR) 20 MG tablet, , Disp: , Rfl:   •  buPROPion XL (WELLBUTRIN XL) 300 MG 24 hr tablet, , Disp: , Rfl:   •  escitalopram (LEXAPRO) 20 MG tablet, Take 20 mg by mouth daily., Disp: , Rfl:   •  furosemide (LASIX) 20 MG tablet, furosemide 20 mg tablet, Disp: , Rfl:   •  metoprolol tartrate (LOPRESSOR) 50 MG tablet, Take 50 mg by mouth daily., Disp: , Rfl:   •  nitroglycerin (NITROSTAT) 0.4 MG SL tablet, nitroglycerin 0.4 mg sublingual tablet, Disp: , Rfl:   •  Synthroid 100 MCG tablet, , Disp: , Rfl:     Allergies   Allergen Reactions   • Bee Venom Anaphylaxis     Other reaction(s): Decreased blood pressure   • Influenza Virus Vaccine Rash     SWELLING AND RASH   • Pneumococcal Vaccines Hives, Rash and Swelling       Social History     Socioeconomic History   • Marital status:    Tobacco Use   • Smoking status: Never Smoker   • Smokeless tobacco: Never Used   Vaping Use   • Vaping Use: Never used   Substance and Sexual Activity   • Alcohol use: No   • Drug use: No       Family History   Problem Relation Age of Onset   • Colon cancer Mother    • Colon polyps Brother        Review of Systems   Constitutional: Negative for appetite change, chills, diaphoresis, fatigue, fever and unexpected weight change.   HENT: Negative for nosebleeds, postnasal drip, sore throat, trouble swallowing and voice change.    Respiratory: Negative for cough, choking, chest tightness, shortness of breath, wheezing and stridor.    Cardiovascular: Negative for chest pain, palpitations and leg swelling.   Gastrointestinal: Positive for abdominal distention, constipation, diarrhea and  nausea. Negative for abdominal pain, anal bleeding, blood in stool, rectal pain and vomiting.   Endocrine: Negative for polydipsia, polyphagia and polyuria.   Musculoskeletal: Negative for gait problem.   Skin: Negative for rash and wound.   Allergic/Immunologic: Negative for food allergies.   Neurological: Negative for dizziness, speech difficulty and light-headedness.   Psychiatric/Behavioral: Negative for confusion, self-injury, sleep disturbance and suicidal ideas.       Vitals:    07/06/22 0856   BP: 167/82   Pulse: 63   Temp: 96.3 °F (35.7 °C)       Physical Exam  Constitutional:       General: She is not in acute distress.     Appearance: She is well-developed. She is not ill-appearing.   HENT:      Head: Normocephalic.   Eyes:      Pupils: Pupils are equal, round, and reactive to light.   Cardiovascular:      Rate and Rhythm: Normal rate and regular rhythm.      Heart sounds: Normal heart sounds.   Pulmonary:      Effort: Pulmonary effort is normal.      Breath sounds: Normal breath sounds.   Abdominal:      General: Bowel sounds are normal. There is distension.      Palpations: Abdomen is soft. There is no mass.      Tenderness: There is no abdominal tenderness. There is no guarding or rebound.      Hernia: No hernia is present.   Musculoskeletal:         General: Normal range of motion.   Skin:     General: Skin is warm and dry.   Neurological:      Mental Status: She is alert and oriented to person, place, and time.   Psychiatric:         Speech: Speech normal.         Behavior: Behavior normal.         Judgment: Judgment normal.         No radiology results for the last 7 days       Assessment and plan    1. Change in bowel habits  - Case Request; Standing  - COVID PRE-OP / PRE-PROCEDURE SCREENING ORDER (NO ISOLATION) - Swab, Nasopharynx; Future  - Case Request    2. FH: colon cancer  - Case Request; Standing  - COVID PRE-OP / PRE-PROCEDURE SCREENING ORDER (NO ISOLATION) - Swab, Nasopharynx; Future  -  Case Request    3. Adenomatous polyp of colon, unspecified part of colon  - Case Request; Standing  - COVID PRE-OP / PRE-PROCEDURE SCREENING ORDER (NO ISOLATION) - Swab, Nasopharynx; Future  - Case Request    4. FH: colon polyps  - Case Request; Standing  - COVID PRE-OP / PRE-PROCEDURE SCREENING ORDER (NO ISOLATION) - Swab, Nasopharynx; Future  - Case Request    5. Nausea  - Case Request; Standing  - COVID PRE-OP / PRE-PROCEDURE SCREENING ORDER (NO ISOLATION) - Swab, Nasopharynx; Future  - Case Request      Given her history and current symptoms recommend an EGD and colonoscopy with Dr. Castro for further evaluation.  Patient is overdue for screening colonoscopy anyway.  Patient is agreeable to the scopes.  For now I would like her to increase the Metamucil to 2 daily.  Continue high-fiber diet.  Patient to call the office next week with an update.  Patient to follow-up with me after her scopes.  Patient is agreeable to the plan.

## 2022-07-13 ENCOUNTER — TELEPHONE (OUTPATIENT)
Dept: GASTROENTEROLOGY | Facility: CLINIC | Age: 67
End: 2022-07-13

## 2022-07-13 PROBLEM — Z83.719 FH: COLON POLYPS: Status: ACTIVE | Noted: 2022-07-13

## 2022-07-13 PROBLEM — R11.0 NAUSEA: Status: ACTIVE | Noted: 2022-07-13

## 2022-07-13 PROBLEM — Z83.71 FH: COLON POLYPS: Status: ACTIVE | Noted: 2022-07-13

## 2022-07-13 PROBLEM — R19.4 CHANGE IN BOWEL HABITS: Status: ACTIVE | Noted: 2022-07-13

## 2022-07-13 NOTE — TELEPHONE ENCOUNTER
SOLOMON Weston for COLONOSCOPY/EGD  on 9/30/22 arrive at 8am.Prep instructions mailed to address on file.      Advised PT that [facility] will call with final time 24 hours before procedure. If they do not get a phone call then arrival time will stay the same as given on instructions.

## 2022-07-13 NOTE — TELEPHONE ENCOUNTER
Caller: Coleen Zapien    Relationship to patient: Self    Best call back number: [phone]    Type of visit: COLONOSCOPY/EGD    Additional notes:PT CALLED TO SCHEDULE SCOPES

## 2022-07-28 ENCOUNTER — TELEPHONE (OUTPATIENT)
Dept: GASTROENTEROLOGY | Facility: CLINIC | Age: 67
End: 2022-07-28

## 2022-07-28 NOTE — TELEPHONE ENCOUNTER
Caller: Coleen Zapien    Relationship: Self    Best call back number: [phone]    What is the best time to reach you: ANYTIME    Who are you requesting to speak with (clinical staff, provider,  specific staff member):ELAINE BURDICK    What was the call regarding: PT WANTS TO BE ADDED TO WAIT LIST FOR COLONOSCOPY SCHEDULED FOR 9.30.22. PT ADVISED SHE IS HAVING ISSUES AND DOESN'T WISH TO WAIT THAT LONG. I ASKED IF SHE WANTED TO MAKE AN APPT. SHE ADVISED NO JUST WANTED TO SPEAK WITH ELAINE MOHAN.     Do you require a callback: YES

## 2022-09-06 ENCOUNTER — TELEPHONE (OUTPATIENT)
Dept: ORTHOPEDIC SURGERY | Facility: CLINIC | Age: 67
End: 2022-09-06

## 2022-09-06 NOTE — TELEPHONE ENCOUNTER
Caller: ROSMERY     Relationship to patient: SELF     Best call back number: [phone]     Type of visit: BILATERAL KNEE INJECTION   PATIENT STATES SHE WANTS ROOSTER COMB IN THE RIGHT AND REGULAR CORTISONE IN LEFT

## 2022-09-12 ENCOUNTER — CLINICAL SUPPORT (OUTPATIENT)
Dept: ORTHOPEDIC SURGERY | Facility: CLINIC | Age: 67
End: 2022-09-12

## 2022-09-12 VITALS — WEIGHT: 209.6 LBS | HEIGHT: 67 IN | BODY MASS INDEX: 32.9 KG/M2 | TEMPERATURE: 96.2 F

## 2022-09-12 DIAGNOSIS — M17.0 PRIMARY OSTEOARTHRITIS OF BOTH KNEES: Primary | ICD-10-CM

## 2022-09-12 PROCEDURE — 99213 OFFICE O/P EST LOW 20 MIN: CPT | Performed by: ORTHOPAEDIC SURGERY

## 2022-09-12 NOTE — PROGRESS NOTES
"Patient Name: Coleen Zapien   YOB: 1955  Referring Primary Care Physician: Camden Philip MD  BMI: Body mass index is 32.83 kg/m².    Chief Complaint:    Chief Complaint   Patient presents with   • Left Knee - Follow-up   • Right Knee - Follow-up        HPI:     Coleen Zapien is a 67 y.o. female who presents today for evaluation of   Chief Complaint   Patient presents with   • Left Knee - Follow-up   • Right Knee - Follow-up   .  Patient follows up today on her right knee where she has known arthritis.  She had injections before generally helped.  Last time she had one however it hurt a lot for couple days and took a while to get better.  She also ask about viscosupplementation.  She has new complaint today with her left knee bothering her it is very similar but is stiff achy and painful along the medial joint line she want to get that checked      Subjective   Medications:   Home Medications:  Current Outpatient Medications on File Prior to Visit   Medication Sig   • atorvastatin (LIPITOR) 20 MG tablet    • buPROPion XL (WELLBUTRIN XL) 300 MG 24 hr tablet    • escitalopram (LEXAPRO) 20 MG tablet Take 20 mg by mouth daily.   • furosemide (LASIX) 20 MG tablet furosemide 20 mg tablet   • metoprolol tartrate (LOPRESSOR) 50 MG tablet Take 50 mg by mouth daily.   • nitroglycerin (NITROSTAT) 0.4 MG SL tablet nitroglycerin 0.4 mg sublingual tablet   • Synthroid 100 MCG tablet      No current facility-administered medications on file prior to visit.     Current Medications:  Scheduled Meds:  Continuous Infusions:No current facility-administered medications for this visit.    PRN Meds:.    I have reviewed the patient's medical history in detail and updated the computerized patient record.  Review and summarization of old records includes:    Past Medical History:   Diagnosis Date   • Abdominal pain     LEFT SIDE   • Abrasion     BILATERAL ARM, PT \"PICKS\" AT ARMS   • Anxiety and depression    • Arthritis "    • Disease of thyroid gland    • History of kidney stones    • IBS (irritable bowel syndrome)    • Interstitial cystitis    • PVC (premature ventricular contraction)     BEEN ON MEDS FOR ONE YEAR   • Torn meniscus     RIGHT KNEE        Past Surgical History:   Procedure Laterality Date   • COLONOSCOPY      2010   • COLONOSCOPY N/A 8/18/2016    Procedure: COLONOSCOPY INTO CECUM AND TI WITH COLD BIOPSY POLYPECTOMIES;  Surgeon: Clive Castro MD;  Location: [facility] ENDOSCOPY;  Service:    • COLONOSCOPY N/A 1/29/2019    Procedure: COLONOSCOPY TO CECUM WITH POLYPECTOMIES;  Surgeon: Clive Castro MD;  Location: [facility] ENDOSCOPY;  Service: Gastroenterology   • DIAGNOSTIC LAPAROSCOPY EXPLORATORY LAPAROTOMY     • EXTRACORPOREAL SHOCK WAVE LITHOTRIPSY (ESWL)          Social History     Occupational History   • Not on file   Tobacco Use   • Smoking status: Never Smoker   • Smokeless tobacco: Never Used   Vaping Use   • Vaping Use: Never used   Substance and Sexual Activity   • Alcohol use: No   • Drug use: No   • Sexual activity: Not on file      Social History     Social History Narrative   • Not on file        Family History   Problem Relation Age of Onset   • Colon cancer Mother    • Colon polyps Brother        ROS: 14 point review of systems was performed and all other systems were reviewed and are negative except for documented findings in HPI and today's encounter.     Allergies:   Allergies   Allergen Reactions   • Bee Venom Anaphylaxis     Other reaction(s): Decreased blood pressure   • Influenza Virus Vaccine Rash     SWELLING AND RASH   • Pneumococcal Vaccines Hives, Rash and Swelling     Constitutional:  Denies fever, shaking or chills   Eyes:  Denies change in visual acuity   HENT:  Denies nasal congestion or sore throat   Respiratory:  Denies cough or shortness of breath   Cardiovascular:  Denies chest pain or severe LE edema   GI:  Denies abdominal pain, nausea, vomiting, bloody stools or diarrhea  "  Musculoskeletal:  Numbness, tingling, pain, or loss of motor function only as noted above in history of present illness.  : Denies painful urination or hematuria  Integument:  Denies rash, lesion or ulceration   Neurologic:  Denies headache or focal weakness  Endocrine:  Denies lymphadenopathy  Psych:  Denies confusion or change in mental status   Hem:  Denies active bleeding    OBJECTIVE:  Physical Exam: 67 y.o. female  Wt Readings from Last 3 Encounters:   09/12/22 95.1 kg (209 lb 9.6 oz)   07/06/22 95.1 kg (209 lb 9.6 oz)   05/04/22 94.3 kg (208 lb)     Ht Readings from Last 1 Encounters:   09/12/22 170.2 cm (67\")     Body mass index is 32.83 kg/m².  Vitals:    09/12/22 1005   Temp: 96.2 °F (35.7 °C)     Vital signs reviewed.     General Appearance:    Alert, cooperative, in no acute distress                  Eyes: conjunctiva clear  ENT: external ears and nose atraumatic  CV: no peripheral edema  Resp: normal respiratory effort  Skin: no rashes or wounds; normal turgor  Psych: mood and affect appropriate  Lymph: no nodes appreciated  Neuro: gross sensation intact  Vascular:  Palpable peripheral pulse in noted extremity  Musculoskeletal Extremities: Examination today shows bilateral knees of varus crepitation synovitis joint line tenderness and some stiffness she walks with a start up limp but is able to walk well after that    Radiology:   Views AP lateral 40 degree PA x-ray of the right knee with visualized portion of the left knee taken and viewed for this particular visit with comparison view shows advanced arthritic change with narrowing and osteophytes bilaterally.        Assessment:     ICD-10-CM ICD-9-CM   1. Primary osteoarthritis of both knees  M17.0 715.16        MDM/Plan:   The diagnosis(es), natural history, pathophysiology and treatment for diagnosis(es) were discussed. Opportunity given and questions answered.  Biomechanics of pertinent body areas discussed.  When appropriate, the use of " "ambulatory aids discussed.    BMI:  The concept of BMI body mass index and its importance and implications discussed.    EXERCISES:  Advice on benefits of, and types of regular/moderate exercise pertaining to orthopedic diagnosis(es).  MEDICATIONS:  The risks, benefits, warnings,side effects and alternatives of medications discussed.  Inflammation/pain control; with cold, heat, elevation and/or liniments discussed as appropriate  Cortisone Injection. See procedure note.  MEDICAL RECORDS reviewed from other provider(s) for past and current medical history pertinent to this complaint.  Suggest to try corticosteroids in both knees.  I went over the \"success\" rate of viscosupplementation is currently believed.  Sounds like she does had some bleeding or a steroid flare something last time hopefully that would repeat itself but we will watch it and see.  He was injected without difficulty and would follow-up as needed also talked about possibility physical therapy if she failed to get better    9/12/2022    Dictated utilizing Dragon dictation    "

## 2022-09-30 ENCOUNTER — HOSPITAL ENCOUNTER (OUTPATIENT)
Facility: HOSPITAL | Age: 67
Setting detail: HOSPITAL OUTPATIENT SURGERY
Discharge: HOME | End: 2022-09-30
Attending: INTERNAL MEDICINE | Admitting: INTERNAL MEDICINE

## 2022-09-30 ENCOUNTER — ANESTHESIA EVENT (OUTPATIENT)
Dept: GASTROENTEROLOGY | Facility: HOSPITAL | Age: 67
End: 2022-09-30

## 2022-09-30 ENCOUNTER — ANESTHESIA (OUTPATIENT)
Dept: GASTROENTEROLOGY | Facility: HOSPITAL | Age: 67
End: 2022-09-30

## 2022-09-30 VITALS
HEART RATE: 66 BPM | HEIGHT: 67 IN | OXYGEN SATURATION: 100 % | RESPIRATION RATE: 17 BRPM | WEIGHT: 206 LBS | BODY MASS INDEX: 32.33 KG/M2 | DIASTOLIC BLOOD PRESSURE: 72 MMHG | SYSTOLIC BLOOD PRESSURE: 136 MMHG | TEMPERATURE: 97.8 F

## 2022-09-30 DIAGNOSIS — Z80.0 FH: COLON CANCER: ICD-10-CM

## 2022-09-30 DIAGNOSIS — Z83.71 FH: COLON POLYPS: ICD-10-CM

## 2022-09-30 DIAGNOSIS — R19.4 CHANGE IN BOWEL HABITS: ICD-10-CM

## 2022-09-30 DIAGNOSIS — R11.0 NAUSEA: ICD-10-CM

## 2022-09-30 DIAGNOSIS — D12.6 ADENOMATOUS POLYP OF COLON, UNSPECIFIED PART OF COLON: ICD-10-CM

## 2022-09-30 PROCEDURE — 88342 IMHCHEM/IMCYTCHM 1ST ANTB: CPT | Performed by: INTERNAL MEDICINE

## 2022-09-30 PROCEDURE — 25010000002 PHENYLEPHRINE 10 MG/ML SOLUTION: Performed by: ANESTHESIOLOGY

## 2022-09-30 PROCEDURE — 25010000002 PROPOFOL 10 MG/ML EMULSION: Performed by: ANESTHESIOLOGY

## 2022-09-30 PROCEDURE — 0 LIDOCAINE 1 % SOLUTION: Performed by: ANESTHESIOLOGY

## 2022-09-30 PROCEDURE — G0105 COLORECTAL SCRN; HI RISK IND: HCPCS | Performed by: INTERNAL MEDICINE

## 2022-09-30 PROCEDURE — 43239 EGD BIOPSY SINGLE/MULTIPLE: CPT | Performed by: INTERNAL MEDICINE

## 2022-09-30 PROCEDURE — 88305 TISSUE EXAM BY PATHOLOGIST: CPT | Performed by: INTERNAL MEDICINE

## 2022-09-30 RX ORDER — HYDRALAZINE HYDROCHLORIDE 20 MG/ML
10 INJECTION INTRAMUSCULAR; INTRAVENOUS
Status: DISCONTINUED | OUTPATIENT
Start: 2022-09-30 | End: 2022-09-30 | Stop reason: HOSPADM

## 2022-09-30 RX ORDER — PROPOFOL 10 MG/ML
VIAL (ML) INTRAVENOUS AS NEEDED
Status: DISCONTINUED | OUTPATIENT
Start: 2022-09-30 | End: 2022-09-30 | Stop reason: SURG

## 2022-09-30 RX ORDER — ONDANSETRON 2 MG/ML
4 INJECTION INTRAMUSCULAR; INTRAVENOUS ONCE AS NEEDED
Status: DISCONTINUED | OUTPATIENT
Start: 2022-09-30 | End: 2022-09-30 | Stop reason: HOSPADM

## 2022-09-30 RX ORDER — FENTANYL CITRATE 50 UG/ML
25 INJECTION, SOLUTION INTRAMUSCULAR; INTRAVENOUS
Status: DISCONTINUED | OUTPATIENT
Start: 2022-09-30 | End: 2022-09-30 | Stop reason: HOSPADM

## 2022-09-30 RX ORDER — NALOXONE HCL 0.4 MG/ML
0.4 VIAL (ML) INJECTION AS NEEDED
Status: DISCONTINUED | OUTPATIENT
Start: 2022-09-30 | End: 2022-09-30 | Stop reason: HOSPADM

## 2022-09-30 RX ORDER — PHENYLEPHRINE HYDROCHLORIDE 10 MG/ML
INJECTION INTRAVENOUS AS NEEDED
Status: DISCONTINUED | OUTPATIENT
Start: 2022-09-30 | End: 2022-09-30 | Stop reason: SURG

## 2022-09-30 RX ORDER — EPHEDRINE SULFATE 50 MG/ML
5 INJECTION, SOLUTION INTRAVENOUS ONCE AS NEEDED
Status: DISCONTINUED | OUTPATIENT
Start: 2022-09-30 | End: 2022-09-30 | Stop reason: HOSPADM

## 2022-09-30 RX ORDER — LABETALOL HYDROCHLORIDE 5 MG/ML
5 INJECTION, SOLUTION INTRAVENOUS
Status: DISCONTINUED | OUTPATIENT
Start: 2022-09-30 | End: 2022-09-30 | Stop reason: HOSPADM

## 2022-09-30 RX ORDER — LIDOCAINE HYDROCHLORIDE 10 MG/ML
INJECTION, SOLUTION INFILTRATION; PERINEURAL AS NEEDED
Status: DISCONTINUED | OUTPATIENT
Start: 2022-09-30 | End: 2022-09-30 | Stop reason: SURG

## 2022-09-30 RX ORDER — SODIUM CHLORIDE, SODIUM LACTATE, POTASSIUM CHLORIDE, CALCIUM CHLORIDE 600; 310; 30; 20 MG/100ML; MG/100ML; MG/100ML; MG/100ML
30 INJECTION, SOLUTION INTRAVENOUS CONTINUOUS PRN
Status: DISCONTINUED | OUTPATIENT
Start: 2022-09-30 | End: 2022-09-30 | Stop reason: HOSPADM

## 2022-09-30 RX ORDER — DIPHENHYDRAMINE HYDROCHLORIDE 50 MG/ML
6.25 INJECTION INTRAMUSCULAR; INTRAVENOUS
Status: DISCONTINUED | OUTPATIENT
Start: 2022-09-30 | End: 2022-09-30 | Stop reason: HOSPADM

## 2022-09-30 RX ADMIN — SODIUM CHLORIDE, POTASSIUM CHLORIDE, SODIUM LACTATE AND CALCIUM CHLORIDE 30 ML/HR: 600; 310; 30; 20 INJECTION, SOLUTION INTRAVENOUS at 09:04

## 2022-09-30 RX ADMIN — PROPOFOL 200 MCG/KG/MIN: 10 INJECTION, EMULSION INTRAVENOUS at 09:20

## 2022-09-30 RX ADMIN — PROPOFOL 120 MG: 10 INJECTION, EMULSION INTRAVENOUS at 09:20

## 2022-09-30 RX ADMIN — LIDOCAINE HYDROCHLORIDE 30 MG: 10 INJECTION, SOLUTION INFILTRATION; PERINEURAL at 09:20

## 2022-09-30 RX ADMIN — PHENYLEPHRINE HYDROCHLORIDE 200 MCG: 10 INJECTION INTRAVENOUS at 09:35

## 2022-09-30 NOTE — ANESTHESIA POSTPROCEDURE EVALUATION
"Patient: Coleen Zapien    Procedure Summary     Date: 09/30/22 Room / Location: [facility] ENDOSCOPY 6 / [facility] ENDOSCOPY    Anesthesia Start: 0914 Anesthesia Stop: 0949    Procedures:       ESOPHAGOGASTRODUODENOSCOPY with biopsies (N/A Esophagus)      COLONOSCOPY TO CECUM AND TI (N/A ) Diagnosis:       Change in bowel habits      FH: colon cancer      Adenomatous polyp of colon, unspecified part of colon      FH: colon polyps      Nausea      (Change in bowel habits [R19.4])      (FH: colon cancer [Z80.0])      (Adenomatous polyp of colon, unspecified part of colon [D12.6])      (FH: colon polyps [Z83.71])      (Nausea [R11.0])    Surgeons: Clive Castro MD Provider: Jonh Lundberg MD    Anesthesia Type: MAC ASA Status: 2          Anesthesia Type: MAC    Vitals  Vitals Value Taken Time   BP 84/48 09/30/22 0948   Temp     Pulse 60 09/30/22 0948   Resp 17 09/30/22 0948   SpO2 100 % 09/30/22 0948           Post Anesthesia Care and Evaluation    Patient location during evaluation: bedside  Pain management: adequate    Airway patency: patent  Anesthetic complications: No anesthetic complications    Cardiovascular status: acceptable  Respiratory status: acceptable  Hydration status: acceptable    Comments: *BP (!) 84/48 (BP Location: Left arm, Patient Position: Lying)   Pulse 60   Temp 36.6 °C (97.8 °F) (Oral)   Resp 17   Ht 170.2 cm (67\")   Wt 93.4 kg (206 lb)   SpO2 100%   BMI 32.26 kg/m²         "

## 2022-10-04 LAB
LAB AP CASE REPORT: NORMAL
LAB AP SPECIAL STAINS: NORMAL
PATH REPORT.FINAL DX SPEC: NORMAL
PATH REPORT.GROSS SPEC: NORMAL

## 2022-10-18 NOTE — PROGRESS NOTES
10/18/22       Please tell her that pathology from her recent EGD showed mild chronic inflammation in the stomach but no evidence of Helicobacter pylori.  Please send a copy of this report to her PCP.  Camacho martin

## 2022-10-24 ENCOUNTER — TELEPHONE (OUTPATIENT)
Dept: GASTROENTEROLOGY | Facility: CLINIC | Age: 67
End: 2022-10-24

## 2022-10-24 NOTE — TELEPHONE ENCOUNTER
Call to pt.  Advise per Dr Castro note.  Verb understanding.     Path report faxed via epic to Dr Camden Philip.

## 2022-10-24 NOTE — TELEPHONE ENCOUNTER
----- Message from Clive Castro MD sent at 10/18/2022  1:18 PM EDT -----  10/18/22       Please tell her that pathology from her recent EGD showed mild chronic inflammation in the stomach but no evidence of Helicobacter pylori.  Please send a copy of this report to her PCP.  Camacho kjtavares

## 2022-10-26 ENCOUNTER — TELEPHONE (OUTPATIENT)
Dept: ORTHOPEDIC SURGERY | Facility: CLINIC | Age: 67
End: 2022-10-26

## 2022-10-26 ENCOUNTER — OFFICE VISIT (OUTPATIENT)
Dept: ORTHOPEDIC SURGERY | Facility: CLINIC | Age: 67
End: 2022-10-26

## 2022-10-26 VITALS — BODY MASS INDEX: 32.33 KG/M2 | HEIGHT: 67 IN | WEIGHT: 206 LBS | TEMPERATURE: 97.3 F

## 2022-10-26 DIAGNOSIS — M79.661 RIGHT CALF PAIN: ICD-10-CM

## 2022-10-26 DIAGNOSIS — M17.11 ARTHRITIS OF RIGHT KNEE: ICD-10-CM

## 2022-10-26 DIAGNOSIS — M25.562 PAIN IN BOTH KNEES, UNSPECIFIED CHRONICITY: Primary | ICD-10-CM

## 2022-10-26 DIAGNOSIS — M79.89 SWELLING OF RIGHT LOWER EXTREMITY: ICD-10-CM

## 2022-10-26 DIAGNOSIS — M25.561 PAIN IN BOTH KNEES, UNSPECIFIED CHRONICITY: Primary | ICD-10-CM

## 2022-10-26 PROCEDURE — 73562 X-RAY EXAM OF KNEE 3: CPT | Performed by: ORTHOPAEDIC SURGERY

## 2022-10-26 PROCEDURE — 99214 OFFICE O/P EST MOD 30 MIN: CPT | Performed by: ORTHOPAEDIC SURGERY

## 2022-10-26 RX ORDER — METHYLPREDNISOLONE 4 MG/1
TABLET ORAL
Qty: 21 TABLET | Refills: 0 | Status: SHIPPED | OUTPATIENT
Start: 2022-10-26 | End: 2023-02-13

## 2022-10-26 NOTE — TELEPHONE ENCOUNTER
Provider: DR FAJARDO    Caller: ROSMERY LOCKETT     Relationship to Patient: SELF     Phone Number: [phone]    Reason for Call: PATIENT CALLED AND STATED THAT SHE DID NOT GET WORKED IN TODAY FOR THE DOPPLER BUT HAS AN APPOINTMENT FOR TOMORROW AT 1:30 PLEASE ADVISE

## 2022-10-26 NOTE — TELEPHONE ENCOUNTER
She should keep the appointment for tomorrow, we need to get the ultrasound. If she has any shortness of breath, chest pain or dizziness or significant increases in pain in that leg she should go to the ER for further evaluation.

## 2022-10-26 NOTE — PROGRESS NOTES
"Patient Name: Coleen Zapien   YOB: 1955  Referring Primary Care Physician: Camden Philip MD  BMI: Body mass index is 32.26 kg/m².    Chief Complaint:    Chief Complaint   Patient presents with   • Left Knee - Follow-up   • Right Knee - Follow-up        HPI:     Coleen Zapien is a 67 y.o. female who presents today for evaluation of   Chief Complaint   Patient presents with   • Left Knee - Follow-up   • Right Knee - Follow-up   .  Coleen is seen today complaining of right leg pain.  She says it started a few weeks ago when she had pain in her right foot she said it gradually moved up to her calf is having calf pain and swelling and she also has it hurting around her knee posteriorly.  She says most of her pain is in her fleshy area of her proximal calf.  I reviewed the chart and appears that she was sent for ankle-brachial indexes a couple of weeks ago.  She said \"those were normal\".  She is having trouble walking because the leg hurts.      Subjective   Medications:   Home Medications:  Current Outpatient Medications on File Prior to Visit   Medication Sig   • atorvastatin (LIPITOR) 20 MG tablet Take 20 mg by mouth Daily.   • buPROPion XL (WELLBUTRIN XL) 300 MG 24 hr tablet Take 300 mg by mouth Every Morning.   • escitalopram (LEXAPRO) 20 MG tablet Take 20 mg by mouth daily.   • furosemide (LASIX) 20 MG tablet Take 20 mg by mouth As Needed.   • metoprolol tartrate (LOPRESSOR) 50 MG tablet Take 50 mg by mouth daily.   • nitroglycerin (NITROSTAT) 0.4 MG SL tablet Place 0.4 mg under the tongue Every 5 (Five) Minutes As Needed.   • Synthroid 100 MCG tablet Take 100 mcg by mouth Daily.     No current facility-administered medications on file prior to visit.     Current Medications:  Scheduled Meds:  Continuous Infusions:No current facility-administered medications for this visit.    PRN Meds:.    I have reviewed the patient's medical history in detail and updated the computerized patient record.  " "Review and summarization of old records includes:    Past Medical History:   Diagnosis Date   • Abdominal pain     LEFT SIDE   • Abrasion     BILATERAL ARM, PT \"PICKS\" AT ARMS   • Anxiety and depression    • Arthritis    • Disease of thyroid gland    • Enlarged lymph nodes    • History of kidney stones    • IBS (irritable bowel syndrome)    • Interstitial cystitis    • PVC (premature ventricular contraction)     BEEN ON MEDS FOR ONE YEAR   • Torn meniscus     RIGHT KNEE        Past Surgical History:   Procedure Laterality Date   • BREAST SURGERY Bilateral     BIOPSIES   • COLONOSCOPY      2010   • COLONOSCOPY N/A 08/18/2016    Procedure: COLONOSCOPY INTO CECUM AND TI WITH COLD BIOPSY POLYPECTOMIES;  Surgeon: Clive Castro MD;  Location: [facility] ENDOSCOPY;  Service:    • COLONOSCOPY N/A 01/29/2019    Procedure: COLONOSCOPY TO CECUM WITH POLYPECTOMIES;  Surgeon: Clive Castro MD;  [facility];  Service: Gastroenterology   • COLONOSCOPY N/A 9/30/2022    Procedure: COLONOSCOPY TO CECUM AND TI;  Surgeon: Clive Castro MD;  Location: [facility] ENDOSCOPY;  Service: Gastroenterology;  Laterality: N/A;  Pre: history of colon polyps, change in bowel habits, family history of colon cancer  Post: DIVERTICULOSIS, INTERNAL HEMORRHOIDS   • DIAGNOSTIC LAPAROSCOPY EXPLORATORY LAPAROTOMY     • ENDOSCOPY N/A 9/30/2022    Procedure: ESOPHAGOGASTRODUODENOSCOPY with biopsies;  Surgeon: Clive Castro MD;  Location: [facility] ENDOSCOPY;  Service: Gastroenterology;  Laterality: N/A;  Pre: nausea  Post: hiatal hernia, gastritis   • EXTRACORPOREAL SHOCK WAVE LITHOTRIPSY (ESWL)     • LAPAROSCOPIC CHOLECYSTECTOMY          Social History     Occupational History   • Not on file   Tobacco Use   • Smoking status: Never   • Smokeless tobacco: Never   Vaping Use   • Vaping Use: Never used   Substance and Sexual Activity   • Alcohol use: No   • Drug use: No   • Sexual activity: Not on file      Social History     Social History Narrative   • " "Not on file        Family History   Problem Relation Age of Onset   • Colon cancer Mother    • Colon polyps Brother    • Malig Hyperthermia Neg Hx        ROS: 14 point review of systems was performed and all other systems were reviewed and are negative except for documented findings in HPI and today's encounter.     Allergies:   Allergies   Allergen Reactions   • Bee Venom Anaphylaxis     Other reaction(s): Decreased blood pressure   • Influenza Virus Vaccine Rash     SWELLING AND RASH   • Pneumococcal Vaccines Hives, Rash and Swelling     Constitutional:  Denies fever, shaking or chills   Eyes:  Denies change in visual acuity   HENT:  Denies nasal congestion or sore throat   Respiratory:  Denies cough or shortness of breath   Cardiovascular:  Denies chest pain or severe LE edema   GI:  Denies abdominal pain, nausea, vomiting, bloody stools or diarrhea   Musculoskeletal:  Numbness, tingling, pain, or loss of motor function only as noted above in history of present illness.  : Denies painful urination or hematuria  Integument:  Denies rash, lesion or ulceration   Neurologic:  Denies headache or focal weakness  Endocrine:  Denies lymphadenopathy  Psych:  Denies confusion or change in mental status   Hem:  Denies active bleeding    OBJECTIVE:  Physical Exam: 67 y.o. female  Wt Readings from Last 3 Encounters:   10/26/22 93.4 kg (206 lb)   09/30/22 93.4 kg (206 lb)   09/12/22 95.1 kg (209 lb 9.6 oz)     Ht Readings from Last 1 Encounters:   10/26/22 170.2 cm (67\")     Body mass index is 32.26 kg/m².  Vitals:    10/26/22 1106   Temp: 97.3 °F (36.3 °C)     Vital signs reviewed.     General Appearance:    Alert, cooperative, in no acute distress                  Eyes: conjunctiva clear  ENT: external ears and nose atraumatic  CV: no peripheral edema  Resp: normal respiratory effort  Skin: no rashes or wounds; normal turgor  Psych: mood and affect appropriate  Lymph: no nodes appreciated  Neuro: gross sensation " intact  Vascular:  Palpable peripheral pulse in noted extremity  Musculoskeletal Extremities: Exam today shows she holds her leg in extended position she is tender in the proximal calf and does have swelling down the whole leg she may have a ruptured Baker's cyst but she has stiffness and joint line tenderness as well.  She denies any trauma.    Radiology:   P lateral 40 degree PA x-ray right knee taken the office today for complaints of pain with comparison views shows advanced end-stage bone-on-bone valgus arthritis of the right knee        Assessment:     ICD-10-CM ICD-9-CM   1. Pain in both knees, unspecified chronicity  M25.561 719.46    M25.562    2. Swelling of right lower extremity  M79.89 729.81   3. Arthritis of right knee  M17.11 716.96   4. Right calf pain  M79.661 729.5        MDM/Plan:   The diagnosis(es), natural history, pathophysiology and treatment for diagnosis(es) were discussed. Opportunity given and questions answered.  Biomechanics of pertinent body areas discussed.  When appropriate, the use of ambulatory aids discussed.    MEDICATIONS:  The risks, benefits, warnings,side effects and alternatives of medications discussed.  Inflammation/pain control; with cold, heat, elevation and/or liniments discussed as appropriate  MEDICAL RECORDS reviewed from other provider(s) for past and current medical history pertinent to this complaint.  First of all with the calf pain I want to get a stat Doppler to rule out DVT.  I explained to her concerns if it was a DVT.  It may very well be something such as Baker's cyst is ruptured explained that as well.  Want her to elevate she use crutches or walker and she says heat helps and wanted to do that.  I did give her Medrol Dosepak to help control all-around inflammation and she had a cortisone shot it looks like on September 12 although there was no proc doc entered.  We will see her back in about 4 to 5 weeks we could reinject her and see if the Medrol Dosepak  helps.  I want her to go over get the Doppler however to make sure that we will get there    10/26/2022    Dictated utilizing Dragon dictation

## 2022-10-27 ENCOUNTER — HOSPITAL ENCOUNTER (OUTPATIENT)
Dept: CARDIOLOGY | Facility: HOSPITAL | Age: 67
Discharge: HOME | End: 2022-10-27
Admitting: ORTHOPAEDIC SURGERY

## 2022-10-27 DIAGNOSIS — M79.89 SWELLING OF RIGHT LOWER EXTREMITY: ICD-10-CM

## 2022-10-27 LAB
BH CV LOWER VASCULAR LEFT COMMON FEMORAL AUGMENT: NORMAL
BH CV LOWER VASCULAR LEFT COMMON FEMORAL COMPETENT: NORMAL
BH CV LOWER VASCULAR LEFT COMMON FEMORAL COMPRESS: NORMAL
BH CV LOWER VASCULAR LEFT COMMON FEMORAL PHASIC: NORMAL
BH CV LOWER VASCULAR LEFT COMMON FEMORAL SPONT: NORMAL
BH CV LOWER VASCULAR RIGHT COMMON FEMORAL AUGMENT: NORMAL
BH CV LOWER VASCULAR RIGHT COMMON FEMORAL COMPETENT: NORMAL
BH CV LOWER VASCULAR RIGHT COMMON FEMORAL COMPRESS: NORMAL
BH CV LOWER VASCULAR RIGHT COMMON FEMORAL PHASIC: NORMAL
BH CV LOWER VASCULAR RIGHT COMMON FEMORAL SPONT: NORMAL
BH CV LOWER VASCULAR RIGHT DISTAL FEMORAL COMPRESS: NORMAL
BH CV LOWER VASCULAR RIGHT GASTRONEMIUS COMPRESS: NORMAL
BH CV LOWER VASCULAR RIGHT GREATER SAPH AK COMPRESS: NORMAL
BH CV LOWER VASCULAR RIGHT GREATER SAPH BK COMPRESS: NORMAL
BH CV LOWER VASCULAR RIGHT LESSER SAPH COMPRESS: NORMAL
BH CV LOWER VASCULAR RIGHT MID FEMORAL AUGMENT: NORMAL
BH CV LOWER VASCULAR RIGHT MID FEMORAL COMPETENT: NORMAL
BH CV LOWER VASCULAR RIGHT MID FEMORAL COMPRESS: NORMAL
BH CV LOWER VASCULAR RIGHT MID FEMORAL PHASIC: NORMAL
BH CV LOWER VASCULAR RIGHT MID FEMORAL SPONT: NORMAL
BH CV LOWER VASCULAR RIGHT PERONEAL COMPRESS: NORMAL
BH CV LOWER VASCULAR RIGHT POPLITEAL AUGMENT: NORMAL
BH CV LOWER VASCULAR RIGHT POPLITEAL COMPETENT: NORMAL
BH CV LOWER VASCULAR RIGHT POPLITEAL COMPRESS: NORMAL
BH CV LOWER VASCULAR RIGHT POPLITEAL PHASIC: NORMAL
BH CV LOWER VASCULAR RIGHT POPLITEAL SPONT: NORMAL
BH CV LOWER VASCULAR RIGHT POSTERIOR TIBIAL COMPRESS: NORMAL
BH CV LOWER VASCULAR RIGHT PROFUNDA FEMORAL COMPRESS: NORMAL
BH CV LOWER VASCULAR RIGHT PROXIMAL FEMORAL COMPRESS: NORMAL
BH CV LOWER VASCULAR RIGHT SAPHENOFEMORAL JUNCTION COMPRESS: NORMAL
BH CV VAS POP FLUID COLLECTED: 1
MAXIMAL PREDICTED HEART RATE: 151 BPM
STRESS TARGET HR: 128 BPM

## 2022-10-27 PROCEDURE — 93971 EXTREMITY STUDY: CPT

## 2022-11-30 ENCOUNTER — TELEPHONE (OUTPATIENT)
Dept: ORTHOPEDIC SURGERY | Facility: CLINIC | Age: 67
End: 2022-11-30

## 2022-11-30 ENCOUNTER — OFFICE VISIT (OUTPATIENT)
Dept: ORTHOPEDIC SURGERY | Facility: CLINIC | Age: 67
End: 2022-11-30

## 2022-11-30 VITALS — HEIGHT: 67 IN | BODY MASS INDEX: 32.8 KG/M2 | WEIGHT: 209 LBS | TEMPERATURE: 96.5 F

## 2022-11-30 DIAGNOSIS — M17.11 ARTHRITIS OF RIGHT KNEE: Primary | ICD-10-CM

## 2022-11-30 DIAGNOSIS — M17.0 ARTHRITIS OF BOTH KNEES: ICD-10-CM

## 2022-11-30 PROCEDURE — 99214 OFFICE O/P EST MOD 30 MIN: CPT | Performed by: ORTHOPAEDIC SURGERY

## 2022-11-30 PROCEDURE — 20610 DRAIN/INJ JOINT/BURSA W/O US: CPT | Performed by: ORTHOPAEDIC SURGERY

## 2022-11-30 RX ORDER — POVIDONE-IODINE 10 MG/ML
SOLUTION TOPICAL ONCE
Status: CANCELLED | OUTPATIENT
Start: 2023-02-28 | End: 2022-11-30

## 2022-11-30 RX ORDER — ACETAMINOPHEN 325 MG/1
1000 TABLET ORAL ONCE
Status: CANCELLED | OUTPATIENT
Start: 2023-02-28 | End: 2022-11-30

## 2022-11-30 RX ORDER — CHLORHEXIDINE GLUCONATE 500 MG/1
1 CLOTH TOPICAL TAKE AS DIRECTED
Status: CANCELLED | OUTPATIENT
Start: 2022-11-30

## 2022-11-30 RX ORDER — MELOXICAM 15 MG/1
15 TABLET ORAL ONCE
Status: CANCELLED | OUTPATIENT
Start: 2023-02-28 | End: 2022-11-30

## 2022-11-30 RX ORDER — METHYLPREDNISOLONE ACETATE 80 MG/ML
80 INJECTION, SUSPENSION INTRA-ARTICULAR; INTRALESIONAL; INTRAMUSCULAR; SOFT TISSUE
Status: COMPLETED | OUTPATIENT
Start: 2022-11-30 | End: 2022-11-30

## 2022-11-30 RX ORDER — PREGABALIN 75 MG/1
150 CAPSULE ORAL ONCE
Status: CANCELLED | OUTPATIENT
Start: 2023-02-28 | End: 2022-11-30

## 2022-11-30 RX ORDER — CEFAZOLIN SODIUM 2 G/100ML
2 INJECTION, SOLUTION INTRAVENOUS ONCE
Status: CANCELLED | OUTPATIENT
Start: 2023-02-28 | End: 2022-11-30

## 2022-11-30 RX ADMIN — METHYLPREDNISOLONE ACETATE 80 MG: 80 INJECTION, SUSPENSION INTRA-ARTICULAR; INTRALESIONAL; INTRAMUSCULAR; SOFT TISSUE at 11:45

## 2022-11-30 RX ADMIN — METHYLPREDNISOLONE ACETATE 80 MG: 80 INJECTION, SUSPENSION INTRA-ARTICULAR; INTRALESIONAL; INTRAMUSCULAR; SOFT TISSUE at 11:43

## 2022-11-30 NOTE — PROGRESS NOTES
Patient Name: Coleen Zapien   YOB: 1955  Referring Primary Care Physician: Camden Philip MD  BMI: Body mass index is 32.73 kg/m².    Chief Complaint:    Chief Complaint   Patient presents with   • Right Knee - Follow-up        HPI:     Coleen Zapien is a 67 y.o. female who presents today for evaluation of   Chief Complaint   Patient presents with   • Right Knee - Follow-up   .  Patient follows up today at which she was seen about a month ago with right more than left knee bothering her.  She says she is better but still has a hard time bending the knee hurts when she goes up and down stairs etc. she has been to physical therapy and is been trying to do home exercise program she had a Medrol Dosepak and she is taken some Aleve at this time.  Right knee is bothersome but she wants to talk about going on with a right total knee replacement.  Would like to get it done as soon as possible      Subjective   Medications:   Home Medications:  Current Outpatient Medications on File Prior to Visit   Medication Sig   • atorvastatin (LIPITOR) 20 MG tablet Take 20 mg by mouth Daily.   • buPROPion XL (WELLBUTRIN XL) 300 MG 24 hr tablet Take 300 mg by mouth Every Morning.   • escitalopram (LEXAPRO) 20 MG tablet Take 20 mg by mouth daily.   • furosemide (LASIX) 20 MG tablet Take 20 mg by mouth As Needed.   • methylPREDNISolone (MEDROL) 4 MG dose pack Take as directed on package instructions.   • metoprolol tartrate (LOPRESSOR) 50 MG tablet Take 50 mg by mouth daily.   • nitroglycerin (NITROSTAT) 0.4 MG SL tablet Place 0.4 mg under the tongue Every 5 (Five) Minutes As Needed.   • Synthroid 100 MCG tablet Take 100 mcg by mouth Daily.     No current facility-administered medications on file prior to visit.     Current Medications:  Scheduled Meds:  Continuous Infusions:No current facility-administered medications for this visit.    PRN Meds:.    I have reviewed the patient's medical history in detail and updated  "the computerized patient record.  Review and summarization of old records includes:    Past Medical History:   Diagnosis Date   • Abdominal pain     LEFT SIDE   • Abrasion     BILATERAL ARM, PT \"PICKS\" AT ARMS   • Anxiety and depression    • Arthritis    • Disease of thyroid gland    • Enlarged lymph nodes    • History of kidney stones    • IBS (irritable bowel syndrome)    • Interstitial cystitis    • PVC (premature ventricular contraction)     BEEN ON MEDS FOR ONE YEAR   • Torn meniscus     RIGHT KNEE        Past Surgical History:   Procedure Laterality Date   • BREAST SURGERY Bilateral     BIOPSIES   • COLONOSCOPY      2010   • COLONOSCOPY N/A 08/18/2016    Procedure: COLONOSCOPY INTO CECUM AND TI WITH COLD BIOPSY POLYPECTOMIES;  Surgeon: Clive Castro MD;  [facility];  Service:    • COLONOSCOPY N/A 01/29/2019    Procedure: COLONOSCOPY TO CECUM WITH POLYPECTOMIES;  Surgeon: Clive Castro MD;  [facility];  Service: Gastroenterology   • COLONOSCOPY N/A 9/30/2022    Procedure: COLONOSCOPY TO CECUM AND TI;  Surgeon: Clive Castro MD;  [facility];  Service: Gastroenterology;  Laterality: N/A;  Pre: history of colon polyps, change in bowel habits, family history of colon cancer  Post: DIVERTICULOSIS, INTERNAL HEMORRHOIDS   • DIAGNOSTIC LAPAROSCOPY EXPLORATORY LAPAROTOMY     • ENDOSCOPY N/A 9/30/2022    Procedure: ESOPHAGOGASTRODUODENOSCOPY with biopsies;  Surgeon: Clive Castro MD;  [facility];  Service: Gastroenterology;  Laterality: N/A;  Pre: nausea  Post: hiatal hernia, gastritis   • EXTRACORPOREAL SHOCK WAVE LITHOTRIPSY (ESWL)     • LAPAROSCOPIC CHOLECYSTECTOMY          Social History     Occupational History   • Not on file   Tobacco Use   • Smoking status: Never   • Smokeless tobacco: Never   Vaping Use   • Vaping Use: Never used   Substance and Sexual Activity   • Alcohol use: No   • Drug use: No   • Sexual activity: Not on file      Social History " "    Social History Narrative   • Not on file        Family History   Problem Relation Age of Onset   • Colon cancer Mother    • Colon polyps Brother    • Malig Hyperthermia Neg Hx        ROS: 14 point review of systems was performed and all other systems were reviewed and are negative except for documented findings in HPI and today's encounter.     Allergies:   Allergies   Allergen Reactions   • Bee Venom Anaphylaxis     Other reaction(s): Decreased blood pressure   • Influenza Virus Vaccine Rash     SWELLING AND RASH   • Pneumococcal Vaccines Hives, Rash and Swelling     Constitutional:  Denies fever, shaking or chills   Eyes:  Denies change in visual acuity   HENT:  Denies nasal congestion or sore throat   Respiratory:  Denies cough or shortness of breath   Cardiovascular:  Denies chest pain or severe LE edema   GI:  Denies abdominal pain, nausea, vomiting, bloody stools or diarrhea   Musculoskeletal:  Numbness, tingling, pain, or loss of motor function only as noted above in history of present illness.  : Denies painful urination or hematuria  Integument:  Denies rash, lesion or ulceration   Neurologic:  Denies headache or focal weakness  Endocrine:  Denies lymphadenopathy  Psych:  Denies confusion or change in mental status   Hem:  Denies active bleeding    OBJECTIVE:  Physical Exam: 67 y.o. female  Wt Readings from Last 3 Encounters:   11/30/22 94.8 kg (209 lb)   10/26/22 93.4 kg (206 lb)   09/30/22 93.4 kg (206 lb)     Ht Readings from Last 1 Encounters:   11/30/22 170.2 cm (67\")     Body mass index is 32.73 kg/m².  Vitals:    11/30/22 1117   Temp: 96.5 °F (35.8 °C)     Vital signs reviewed.     General Appearance:    Alert, cooperative, in no acute distress                  Eyes: conjunctiva clear  ENT: external ears and nose atraumatic  CV: no peripheral edema  Resp: normal respiratory effort  Skin: no rashes or wounds; normal turgor  Psych: mood and affect appropriate  Lymph: no nodes appreciated  Neuro: " gross sensation intact  Vascular:  Palpable peripheral pulse in noted extremity  Musculoskeletal Extremities: She has crepitation synovitis swelling in her knee with joint line tenderness and patellofemoral tenderness ligaments have some pseudolaxity hips noncontributory    Radiology:   AP lateral 40 degree PA x-ray bilateral knees were taken 10/26/2022 but viewed at this time for purposes of's surgery discussion show advanced end-stage arthritis of the knee that is essentially tricompartmental with the worst bone-on-bone laterally with subchondral sclerosis and marginal osteophytes        Assessment:     ICD-10-CM ICD-9-CM   1. Arthritis of right knee  M17.11 716.96        MDM/Plan:   The diagnosis(es), natural history, pathophysiology and treatment for diagnosis(es) were discussed. Opportunity given and questions answered.  Biomechanics of pertinent body areas discussed.  When appropriate, the use of ambulatory aids discussed.    Inflammation/pain control; with cold, heat, elevation and/or liniments discussed as appropriate  Cortisone Injection. See procedure note.  HOME EXERCISE/PT program encouraged  MEDICAL RECORDS reviewed from other provider(s) for past and current medical history pertinent to this complaint.  Total Knee Replacement : Continuation of conservative management vs. TKA: I reviewed anatomy of a total knee arthroplasty in laymen's terms, as well as typical postoperative recovery and possibly 6-12 months for maximal recovery, and possible need for rehabilitation stay after hospitalization. We also discussed risks, benefits, alternatives, and limitations of procedure with risks including but not limited to neurovascular damage, bleeding, infection, malalignment, chronic pain, failure of implants, osteolysis, loosening of implants, loss of motion, weakness, stiffness, instability, DVT, pulmonary embolus, death, stroke, complex regional pain syndrome, myocardial infarction, and need for additional  procedures. Concept of substitution vs. replacement discussed.  No guarantees were given regarding results of surgery.  Patient verbalized understanding, and was given the opportunity to ask and have all questions answered today.   Want to get scheduled for right total knee replacement.  I told her schedule is running little behind and she decided she want injections in both knees today and schedule surgery after the 3 months.  She has had a history of PVCs in the past but that the cardiologist told her she did not need to follow-up of asked that she be cleared by primary care    11/30/2022    Dictated utilizing Dragon dictation    Large Joint Arthrocentesis: R knee  Date/Time: 11/30/2022 11:43 AM  Consent given by: patient  Site marked: site marked  Timeout: Immediately prior to procedure a time out was called to verify the correct patient, procedure, equipment, support staff and site/side marked as required   Supporting Documentation  Indications: pain, joint swelling and diagnostic evaluation   Procedure Details  Location: knee - R knee  Preparation: Patient was prepped and draped in the usual sterile fashion  Needle gauge: 21G.  Approach: anterolateral  Medications administered: 80 mg methylPREDNISolone acetate 80 MG/ML; 2 mL lidocaine (cardiac)  Patient tolerance: patient tolerated the procedure well with no immediate complications    Large Joint Arthrocentesis: L knee  Date/Time: 11/30/2022 11:45 AM  Consent given by: patient  Site marked: site marked  Timeout: Immediately prior to procedure a time out was called to verify the correct patient, procedure, equipment, support staff and site/side marked as required   Supporting Documentation  Indications: pain   Procedure Details  Location: knee - L knee  Preparation: Patient was prepped and draped in the usual sterile fashion  Needle gauge: 21G.  Approach: anterolateral  Medications administered: 80 mg methylPREDNISolone acetate 80 MG/ML; 2 mL lidocaine  (cardiac)  Patient tolerance: patient tolerated the procedure well with no immediate complications

## 2022-11-30 NOTE — TELEPHONE ENCOUNTER
The patient had a steroid flare last time she got an injection. After her injection today she stated it started to feel better, which means it was numbing up appropriately. She maybe feeling the effects of the numbing wearing off, which can make the pain seem worse. She could also be getting another steroid flare. She can continue to use ice and elevation along with topical creams, an/r a cane to help reduce pressure when walking to help reduce the pain until the knee calms down. The steroid itself can take a few weeks to improve the inflammation within her knee. Thank you!

## 2023-01-06 NOTE — TELEPHONE ENCOUNTER
Provider: DR. FAJARDO    Caller: PATIENT    Relationship to Patient: SELF      Phone Number: [phone]    Reason for Call: PT. STATES THAT SHE SAW DR. FAJARDO AND GOT BILATERAL KNEE CORTISONE INJECTIONS.  SHE STATES THAT HER RIGHT KNEE IS HURTING MORE, IT DID NOT HELP THE PAIN.  ASKING WHAT DR. FAJARDO SUGGESTS.      When was the patient last seen: TODAY      Detail Level: Detailed

## 2023-02-13 ENCOUNTER — PRE-ADMISSION TESTING (OUTPATIENT)
Dept: PREADMISSION TESTING | Facility: HOSPITAL | Age: 68
End: 2023-02-13
Payer: MEDICARE

## 2023-02-13 VITALS
SYSTOLIC BLOOD PRESSURE: 169 MMHG | DIASTOLIC BLOOD PRESSURE: 82 MMHG | RESPIRATION RATE: 18 BRPM | TEMPERATURE: 97.7 F | WEIGHT: 210 LBS | HEIGHT: 67 IN | OXYGEN SATURATION: 97 % | HEART RATE: 72 BPM | BODY MASS INDEX: 32.96 KG/M2

## 2023-02-13 LAB
ANION GAP SERPL CALCULATED.3IONS-SCNC: 7 MMOL/L (ref 5–15)
BASOPHILS # BLD AUTO: 0.06 10*3/MM3 (ref 0–0.2)
BASOPHILS NFR BLD AUTO: 1 % (ref 0–1.5)
BUN SERPL-MCNC: 11 MG/DL (ref 8–23)
BUN/CREAT SERPL: 11.3 (ref 7–25)
CALCIUM SPEC-SCNC: 9.6 MG/DL (ref 8.6–10.5)
CHLORIDE SERPL-SCNC: 104 MMOL/L (ref 98–107)
CO2 SERPL-SCNC: 27 MMOL/L (ref 22–29)
CREAT SERPL-MCNC: 0.97 MG/DL (ref 0.57–1)
DEPRECATED RDW RBC AUTO: 43.6 FL (ref 37–54)
EGFRCR SERPLBLD CKD-EPI 2021: 63.4 ML/MIN/1.73
EOSINOPHIL # BLD AUTO: 0.15 10*3/MM3 (ref 0–0.4)
EOSINOPHIL NFR BLD AUTO: 2.5 % (ref 0.3–6.2)
ERYTHROCYTE [DISTWIDTH] IN BLOOD BY AUTOMATED COUNT: 13.6 % (ref 12.3–15.4)
GLUCOSE SERPL-MCNC: 90 MG/DL (ref 65–99)
HCT VFR BLD AUTO: 39.2 % (ref 34–46.6)
HGB BLD-MCNC: 12.5 G/DL (ref 12–15.9)
IMM GRANULOCYTES # BLD AUTO: 0.02 10*3/MM3 (ref 0–0.05)
IMM GRANULOCYTES NFR BLD AUTO: 0.3 % (ref 0–0.5)
LYMPHOCYTES # BLD AUTO: 1.38 10*3/MM3 (ref 0.7–3.1)
LYMPHOCYTES NFR BLD AUTO: 23.2 % (ref 19.6–45.3)
MCH RBC QN AUTO: 28.1 PG (ref 26.6–33)
MCHC RBC AUTO-ENTMCNC: 31.9 G/DL (ref 31.5–35.7)
MCV RBC AUTO: 88.1 FL (ref 79–97)
MONOCYTES # BLD AUTO: 0.56 10*3/MM3 (ref 0.1–0.9)
MONOCYTES NFR BLD AUTO: 9.4 % (ref 5–12)
NEUTROPHILS NFR BLD AUTO: 3.78 10*3/MM3 (ref 1.7–7)
NEUTROPHILS NFR BLD AUTO: 63.6 % (ref 42.7–76)
NRBC BLD AUTO-RTO: 0 /100 WBC (ref 0–0.2)
PLATELET # BLD AUTO: 204 10*3/MM3 (ref 140–450)
PMV BLD AUTO: 10.1 FL (ref 6–12)
POTASSIUM SERPL-SCNC: 4.6 MMOL/L (ref 3.5–5.2)
RBC # BLD AUTO: 4.45 10*6/MM3 (ref 3.77–5.28)
SODIUM SERPL-SCNC: 138 MMOL/L (ref 136–145)
WBC NRBC COR # BLD: 5.95 10*3/MM3 (ref 3.4–10.8)

## 2023-02-13 PROCEDURE — 36415 COLL VENOUS BLD VENIPUNCTURE: CPT

## 2023-02-13 PROCEDURE — 80048 BASIC METABOLIC PNL TOTAL CA: CPT

## 2023-02-13 PROCEDURE — 85025 COMPLETE CBC W/AUTO DIFF WBC: CPT

## 2023-02-13 RX ORDER — CHLORHEXIDINE GLUCONATE 500 MG/1
CLOTH TOPICAL TAKE AS DIRECTED
COMMUNITY

## 2023-02-13 NOTE — DISCHARGE INSTRUCTIONS
Take the following medications the morning of surgery: METOPROLOL, WELLBUTRIN, LEXAPRO, AND SYNTHROID      If you are on prescription narcotic pain medication to control your pain you may also take that medication the morning of surgery.    General Instructions:  Do not eat solid food after midnight the night before surgery.  You may drink clear liquids day of surgery but must stop at least one hour before your hospital arrival time.  It is beneficial for you to have a clear drink that contains carbohydrates the day of surgery.  We suggest a 12 to 20 ounce bottle of Gatorade or Powerade for non-diabetic patients or a 12 to 20 ounce bottle of G2 or Powerade Zero for diabetic patients. (Pediatric patients, are not advised to drink a 12 to 20 ounce carbohydrate drink)    Clear liquids are liquids you can see through.  Nothing red in color.     Plain water                               Sports drinks  Sodas                                   Gelatin (Jell-O)  Fruit juices without pulp such as white grape juice and apple juice  Popsicles that contain no fruit or yogurt  Tea or coffee (no cream or milk added)  Gatorade / Powerade  G2 / Powerade Zero    Infants may have breast milk up to four hours before surgery.  Infants drinking formula may drink formula up to six hours before surgery.   Patients who avoid smoking, chewing tobacco and alcohol for 4 weeks prior to surgery have a reduced risk of post-operative complications.  Quit smoking as many days before surgery as you can.  Do not smoke, use chewing tobacco or drink alcohol the day of surgery.   If applicable bring your C-PAP/ BI-PAP machine.  Bring any papers given to you in the doctor’s office.  Wear clean comfortable clothes.  Do not wear contact lenses, false eyelashes or make-up.  Bring a case for your glasses.   Bring crutches or walker if applicable.  Remove all piercings.  Leave jewelry and any other valuables at home.  Hair extensions with metal clips must be  removed prior to surgery.  The Pre-Admission Testing nurse will instruct you to bring medications if unable to obtain an accurate list in Pre-Admission Testing.        If you were given a blood bank ID arm band remember to bring it with you the day of surgery.    Preventing a Surgical Site Infection:  For 2 to 3 days before surgery, avoid shaving with a razor because the razor can irritate skin and make it easier to develop an infection.    Any areas of open skin can increase the risk of a post-operative wound infection by allowing bacteria to enter and travel throughout the body.  Notify your surgeon if you have any skin wounds / rashes even if it is not near the expected surgical site.  The area will need assessed to determine if surgery should be delayed until it is healed.  The night prior to surgery shower using a fresh bar of anti-bacterial soap (such as Dial) and clean washcloth.  Sleep in a clean bed with clean clothing.  Do not allow pets to sleep with you.  Shower on the morning of surgery using a fresh bar of anti-bacterial soap (such as Dial) and clean washcloth.  Dry with a clean towel and dress in clean clothing.  Ask your surgeon if you will be receiving antibiotics prior to surgery.  Make sure you, your family, and all healthcare providers clean their hands with soap and water or an alcohol based hand  before caring for you or your wound.    Day of surgery:  Your arrival time is approximately two hours before your scheduled surgery time.  Upon arrival, a Pre-op nurse and Anesthesiologist will review your health history, obtain vital signs, and answer questions you may have.  The only belongings needed at this time will be a list of your home medications and if applicable your C-PAP/BI-PAP machine.  A Pre-op nurse will start an IV and you may receive medication in preparation for surgery, including something to help you relax.     Please be aware that surgery does come with discomfort.  We  want to make every effort to control your discomfort so please discuss any uncontrolled symptoms with your nurse.   Your doctor will most likely have prescribed pain medications.      If you are going home after surgery you will receive individualized written care instructions before being discharged.  A responsible adult must drive you to and from the hospital on the day of your surgery and stay with you for 24 hours.  Discharge prescriptions can be filled by the hospital pharmacy during regular pharmacy hours.  If you are having surgery late in the day/evening your prescription may be e-prescribed to your pharmacy.  Please verify your pharmacy hours or chose a 24 hour pharmacy to avoid not having access to your prescription because your pharmacy has closed for the day.    If you are staying overnight following surgery, you will be transported to your hospital room following the recovery period.  [facility] has all private rooms.    If you have any questions please call Pre-Admission Testing at [phone].  Deductibles and co-payments are collected on the day of service. Please be prepared to pay the required co-pay, deductible or deposit on the day of service as defined by your plan.    Call your surgeon immediately if you experience any of the following symptoms:  Sore Throat  Shortness of Breath or difficulty breathing  Cough  Chills  Body soreness or muscle pain  Headache  Fever  New loss of taste or smell  Do not arrive for your surgery ill.  Your procedure will need to be rescheduled to another time.  You will need to call your physician before the day of surgery to avoid any unnecessary exposure to hospital staff as well as other patients.   CHLORHEXIDINE CLOTH INSTRUCTIONS  The morning of surgery follow these instructions using the Chlorhexidine cloths you've been given.  These steps reduce bacteria on the body.  Do not use the cloths near your eyes, ears mouth, genitalia or on open  wounds.  Throw the cloths away after use but do not try to flush them down a toilet.      Open and remove one cloth at a time from the package.    Leave the cloth unfolded and begin the bathing.  Massage the skin with the cloths using gentle pressure to remove bacteria.  Do not scrub harshly.   Follow the steps below with one 2% CHG cloth per area (6 total cloths).  One cloth for neck, shoulders and chest.  One cloth for both arms, hands, fingers and underarms (do underarms last).  One cloth for the abdomen followed by groin.  One cloth for right leg and foot including between the toes.  One cloth for left leg and foot including between the toes.  The last cloth is to be used for the back of the neck, back and buttocks.    Allow the CHG to air dry 3 minutes on the skin which will give it time to work and decrease the chance of irritation.  The skin may feel sticky until it is dry.  Do not rinse with water or any other liquid or you will lose the beneficial effects of the CHG.  If mild skin irritation occurs, do rinse the skin to remove the CHG.  Report this to the nurse at time of admission.  Do not apply lotions, creams, ointments, deodorants or perfumes after using the clothes. Dress in clean clothes before coming to the hospital.

## 2023-02-20 ENCOUNTER — TELEPHONE (OUTPATIENT)
Dept: ORTHOPEDIC SURGERY | Facility: CLINIC | Age: 68
End: 2023-02-20

## 2023-02-20 NOTE — TELEPHONE ENCOUNTER
Caller: ROSMERY LOCKETT    Relationship to patient: SELF    Best call back number: [phone]    Patient is needing: PATIENT IS REQ TO CANCEL HER UPCOMING SX AND COORDINATING APPTS- STATES SHE HAS HAD A FAMILY EMERGENCY COME UP AND NEEDS TO CANCEL AT THIS TIME.  ALSO REQ MOST RECENT LAB RESULTS.   PATIENT HAS A PRE- OP APPT SCHEDULED FOR 02/21/2023

## 2024-06-05 ENCOUNTER — APPOINTMENT (OUTPATIENT)
Dept: WOMENS IMAGING | Facility: HOSPITAL | Age: 69
End: 2024-06-05
Payer: MEDICARE

## 2024-06-05 PROCEDURE — 77066 DX MAMMO INCL CAD BI: CPT | Performed by: RADIOLOGY

## 2024-06-05 PROCEDURE — 76642 ULTRASOUND BREAST LIMITED: CPT | Performed by: RADIOLOGY

## 2024-06-05 PROCEDURE — G0279 TOMOSYNTHESIS, MAMMO: HCPCS | Performed by: RADIOLOGY

## 2024-09-25 ENCOUNTER — TELEPHONE (OUTPATIENT)
Dept: GASTROENTEROLOGY | Facility: CLINIC | Age: 69
End: 2024-09-25
Payer: MEDICARE

## 2024-10-02 ENCOUNTER — TELEPHONE (OUTPATIENT)
Dept: GASTROENTEROLOGY | Facility: CLINIC | Age: 69
End: 2024-10-02
Payer: MEDICARE

## 2024-10-02 NOTE — TELEPHONE ENCOUNTER
Caller: Coleen Zapien    Relationship: Self    Best call back number: [phone]    What is the best time to reach you: ANYTIME     Who are you requesting to speak with (clinical staff, provider,  specific staff member): CLINICAL STAFF         What was the call regarding: PT IS WANTING  RECOMMENDATION ON IF SHE SHOULD GET A COLONOSCOPY. SHE WENT TO THE ER FOR BLEEDING. PLEASE CALL LENA ADVISE

## 2024-10-02 NOTE — TELEPHONE ENCOUNTER
Called pt and  pt reports that she had diarrhea for 2 days last weekend . She also had cramping and she did see blood.  Pt did go to ER .  Records under care everywhere. Pt reports that she is still having low abd pain and cramping.  Pt has not seen any further blood. She is asking if she needs a c/s.   Advised will send message to Dr Castro.  Also was able to make f/u appt for 10/10 at 915a with Dr Castro.   Advised will send message to DR Castro.

## 2024-10-10 ENCOUNTER — TELEPHONE (OUTPATIENT)
Dept: GASTROENTEROLOGY | Facility: CLINIC | Age: 69
End: 2024-10-10
Payer: MEDICARE

## 2024-10-10 ENCOUNTER — OFFICE VISIT (OUTPATIENT)
Dept: GASTROENTEROLOGY | Facility: CLINIC | Age: 69
End: 2024-10-10
Payer: MEDICARE

## 2024-10-10 VITALS
BODY MASS INDEX: 31.22 KG/M2 | HEART RATE: 76 BPM | SYSTOLIC BLOOD PRESSURE: 151 MMHG | TEMPERATURE: 97.3 F | WEIGHT: 206 LBS | DIASTOLIC BLOOD PRESSURE: 79 MMHG | HEIGHT: 68 IN

## 2024-10-10 DIAGNOSIS — D64.9 ANEMIA, UNSPECIFIED TYPE: Primary | ICD-10-CM

## 2024-10-10 DIAGNOSIS — D12.6 ADENOMATOUS POLYP OF COLON, UNSPECIFIED PART OF COLON: ICD-10-CM

## 2024-10-10 DIAGNOSIS — Z80.0 FH: COLON CANCER: ICD-10-CM

## 2024-10-10 DIAGNOSIS — R06.00 DYSPNEA, UNSPECIFIED TYPE: ICD-10-CM

## 2024-10-10 DIAGNOSIS — R10.32 LEFT LOWER QUADRANT ABDOMINAL PAIN: ICD-10-CM

## 2024-10-10 DIAGNOSIS — K62.5 RECTAL BLEEDING: ICD-10-CM

## 2024-10-10 DIAGNOSIS — R13.10 DYSPHAGIA, UNSPECIFIED TYPE: ICD-10-CM

## 2024-10-10 DIAGNOSIS — R19.7 DIARRHEA, UNSPECIFIED TYPE: ICD-10-CM

## 2024-10-10 DIAGNOSIS — Z83.719 FH: COLON POLYPS: ICD-10-CM

## 2024-10-10 PROCEDURE — 1159F MED LIST DOCD IN RCRD: CPT | Performed by: INTERNAL MEDICINE

## 2024-10-10 PROCEDURE — 1160F RVW MEDS BY RX/DR IN RCRD: CPT | Performed by: INTERNAL MEDICINE

## 2024-10-10 PROCEDURE — 99214 OFFICE O/P EST MOD 30 MIN: CPT | Performed by: INTERNAL MEDICINE

## 2024-10-10 RX ORDER — ASPIRIN 81 MG/1
81 TABLET ORAL DAILY
COMMUNITY

## 2024-10-10 NOTE — PROGRESS NOTES
"Chief Complaint   Patient presents with    Diarrhea    Black or Bloody Stool    Abdominal Cramping    Abdominal Pain       History of Present Illness:   69 y.o. female who c/o 3 weeks ago had bloody diarrhea with cramping. She went to the [facility] ER and they did labs and sent her home. In 5/24 her hub had surgery for colon cancer. She now has some LLQ abd aching with radiation to her lower back. Her stool may be pencil thin. No diarrhea now. NO constipation. No rectal bleeding or melena now. No fevers, chills. +night sweats. No chest pain. +SOA. Weight stable. No heartburn. +dysphagia.        She last had an EGD and colonoscopy by me in 9 of 2022.  I recommended a repeat colonoscopy in 5 years.    Past Medical History:   Diagnosis Date    Abdominal pain     LEFT SIDE    Abrasion     BILATERAL ARM, PT \"PICKS\" AT ARMS    Anxiety and depression     Arthritis     Disease of thyroid gland     Enlarged lymph nodes     Hiatal hernia     History of kidney stones     IBS (irritable bowel syndrome)     Interstitial cystitis     PVC (premature ventricular contraction)     BEEN ON MEDS FOR ONE YEAR    Tinnitus     Torn meniscus     RIGHT KNEE       Past Surgical History:   Procedure Laterality Date    BREAST SURGERY Bilateral     BIOPSIES    CATARACT EXTRACTION Bilateral     COLONOSCOPY      2010    COLONOSCOPY N/A 08/18/2016    Procedure: COLONOSCOPY INTO CECUM AND TI WITH COLD BIOPSY POLYPECTOMIES;  Surgeon: Clive Castro MD;  Location: [facility] ENDOSCOPY;  Service:     COLONOSCOPY N/A 01/29/2019    Procedure: COLONOSCOPY TO CECUM WITH POLYPECTOMIES;  Surgeon: Clive Castro MD;  Location: [facility] ENDOSCOPY;  Service: Gastroenterology    COLONOSCOPY N/A 09/30/2022    Procedure: COLONOSCOPY TO CECUM AND TI;  Surgeon: Clive Castro MD;  Location: [facility] ENDOSCOPY;  Service: Gastroenterology;  Laterality: N/A;  Pre: history of colon polyps, change in bowel habits, family history of colon cancer  Post: DIVERTICULOSIS, INTERNAL " HEMORRHOIDS    DIAGNOSTIC LAPAROSCOPY EXPLORATORY LAPAROTOMY      ENDOSCOPY N/A 09/30/2022    Procedure: ESOPHAGOGASTRODUODENOSCOPY with biopsies;  Surgeon: Clive Castro MD;  Location: [facility] ENDOSCOPY;  Service: Gastroenterology;  Laterality: N/A;  Pre: nausea  Post: hiatal hernia, gastritis    EXTRACORPOREAL SHOCK WAVE LITHOTRIPSY (ESWL)      LAPAROSCOPIC CHOLECYSTECTOMY      LYMPH NODE BIOPSY           Current Outpatient Medications:     aspirin 81 MG EC tablet, Take 1 tablet by mouth Daily., Disp: , Rfl:     atorvastatin (LIPITOR) 20 MG tablet, Take 1 tablet by mouth Daily., Disp: , Rfl:     escitalopram (LEXAPRO) 20 MG tablet, Take 1 tablet by mouth Daily., Disp: , Rfl:     furosemide (LASIX) 20 MG tablet, Take 1 tablet by mouth As Needed., Disp: , Rfl:     metoprolol tartrate (LOPRESSOR) 50 MG tablet, Take 2 tablets by mouth Daily., Disp: , Rfl:     nitroglycerin (NITROSTAT) 0.4 MG SL tablet, Place 1 tablet under the tongue Every 5 (Five) Minutes As Needed., Disp: , Rfl:     Synthroid 100 MCG tablet, Take 1 tablet by mouth Daily., Disp: , Rfl:     Allergies   Allergen Reactions    Bee Venom Anaphylaxis     Other reaction(s): Decreased blood pressure    Influenza Virus Vaccine Rash     SWELLING AND RASH    Pneumococcal Vaccines Hives, Rash and Swelling       Family History   Problem Relation Age of Onset    Colon cancer Mother     Colon polyps Brother     Malig Hyperthermia Neg Hx        Social History     Socioeconomic History    Marital status:    Tobacco Use    Smoking status: Never    Smokeless tobacco: Never   Vaping Use    Vaping status: Never Used   Substance and Sexual Activity    Alcohol use: No    Drug use: No    Sexual activity: Defer       Review of Systems   Gastrointestinal:  Negative for abdominal pain.   All other systems reviewed and are negative.    Pertinent positives and negatives documented in the HPI and all other systems reviewed and were found to be negative.  Vitals:    10/10/24  0855   BP: 151/79   Pulse: 76   Temp: 97.3 °F (36.3 °C)       Physical Exam  Vitals reviewed.   Constitutional:       General: She is not in acute distress.     Appearance: Normal appearance. She is well-developed. She is not diaphoretic.   HENT:      Head: Normocephalic and atraumatic. Hair is normal.      Right Ear: Hearing, tympanic membrane, ear canal and external ear normal. No decreased hearing noted. No drainage.      Left Ear: Hearing, tympanic membrane, ear canal and external ear normal. No decreased hearing noted.      Nose: Nose normal. No nasal deformity.      Mouth/Throat:      Mouth: Mucous membranes are moist.   Eyes:      General: Lids are normal.         Right eye: No discharge.         Left eye: No discharge.      Extraocular Movements: Extraocular movements intact.      Conjunctiva/sclera: Conjunctivae normal.      Pupils: Pupils are equal, round, and reactive to light.   Neck:      Thyroid: No thyromegaly.      Vascular: No JVD.      Trachea: No tracheal deviation.   Cardiovascular:      Rate and Rhythm: Normal rate and regular rhythm.      Pulses: Normal pulses.      Heart sounds: Normal heart sounds. No murmur heard.     No friction rub. No gallop.   Pulmonary:      Effort: Pulmonary effort is normal. No respiratory distress.      Breath sounds: Normal breath sounds. No wheezing or rales.   Chest:      Chest wall: No tenderness.   Abdominal:      General: Bowel sounds are normal. There is no distension.      Palpations: Abdomen is soft. There is no mass.      Tenderness: There is no abdominal tenderness. There is no guarding or rebound.      Hernia: No hernia is present.   Genitourinary:     Rectum: Normal. Guaiac result negative.   Musculoskeletal:         General: No tenderness or deformity. Normal range of motion.      Cervical back: Normal range of motion and neck supple.   Lymphadenopathy:      Cervical: No cervical adenopathy.   Skin:     General: Skin is warm and dry.      Findings: No  erythema or rash.   Neurological:      Mental Status: She is alert and oriented to person, place, and time.      Cranial Nerves: No cranial nerve deficit.      Motor: No abnormal muscle tone.      Coordination: Coordination normal.      Deep Tendon Reflexes: Reflexes are normal and symmetric. Reflexes normal.   Psychiatric:         Mood and Affect: Mood normal.         Behavior: Behavior normal.         Thought Content: Thought content normal.         Judgment: Judgment normal.         Diagnoses and all orders for this visit:    1. Anemia, unspecified type (Primary)  -     CBC & Differential  -     Comprehensive Metabolic Panel  -     Ferritin  -     Folate RBC  -     Iron Profile  -     Vitamin B12  -     XR chest pa and lateral; Future  -     CT Abdomen Pelvis With Contrast; Future  -     Case Request; Standing  -     Case Request    2. FH: colon cancer  -     CBC & Differential  -     Comprehensive Metabolic Panel  -     Ferritin  -     Folate RBC  -     Iron Profile  -     Vitamin B12  -     Case Request; Standing  -     Case Request    3. FH: colon polyps  -     CBC & Differential  -     Comprehensive Metabolic Panel  -     Ferritin  -     Folate RBC  -     Iron Profile  -     Vitamin B12  -     Case Request; Standing  -     Case Request    4. Adenomatous polyp of colon, unspecified part of colon  -     CBC & Differential  -     Comprehensive Metabolic Panel  -     Ferritin  -     Folate RBC  -     Iron Profile  -     Vitamin B12  -     XR chest pa and lateral; Future  -     CT Abdomen Pelvis With Contrast; Future  -     Case Request; Standing  -     Case Request    5. Diarrhea, unspecified type  -     CBC & Differential  -     Comprehensive Metabolic Panel  -     Ferritin  -     Folate RBC  -     Iron Profile  -     Vitamin B12  -     XR chest pa and lateral; Future  -     CT Abdomen Pelvis With Contrast; Future  -     Case Request; Standing  -     Case Request    6. Rectal bleeding  -     CBC &  "Differential  -     Comprehensive Metabolic Panel  -     Ferritin  -     Folate RBC  -     Iron Profile  -     Vitamin B12  -     XR chest pa and lateral; Future  -     CT Abdomen Pelvis With Contrast; Future  -     Case Request; Standing  -     Case Request    7. Left lower quadrant abdominal pain  -     XR chest pa and lateral; Future  -     CT Abdomen Pelvis With Contrast; Future  -     Case Request; Standing  -     Case Request    8. Dyspnea, unspecified type  -     XR chest pa and lateral; Future  -     Case Request; Standing  -     Case Request    9. Dysphagia, unspecified type  -     Case Request; Standing  -     Case Request    Other orders  -     Implement Anesthesia orders day of procedure.; Standing  -     Follow Anesthesia Guidelines / Protocol; Future  -     Verify bowel prep was successful; Standing  -     Give tap water enema if bowel prep was insufficient; Standing      Assessment:  History (mother) of colon cancer.  History of colon polyps.  Family history (brother) of colon polyps.  LLQ abd pain  Bloody diarrhea that has resolved.  SOA  H/o anemia  Dysphagia      Recommendations:  CT abd/pelvis  Labs  CXR \"SOA\"  EGD and colonoscopy.      No follow-ups on file.    Clive Castro MD  10/10/2024  "

## 2024-10-10 NOTE — TELEPHONE ENCOUNTER
SOLOMON OVALLES IN SCHEDULING PT SCHEDULED 11/19/2024 ARRIVING AT 845AM MIRALAX/EGD PREP INFORMATION HANDED TO THE PT[facility] FOR HUB TO RELAY

## 2024-10-11 ENCOUNTER — TELEPHONE (OUTPATIENT)
Dept: GASTROENTEROLOGY | Facility: CLINIC | Age: 69
End: 2024-10-11
Payer: MEDICARE

## 2024-10-11 LAB
ALBUMIN SERPL-MCNC: 4.1 G/DL (ref 3.8–4.8)
ALP SERPL-CCNC: 117 IU/L (ref 44–121)
ALT SERPL-CCNC: 18 IU/L (ref 0–32)
AST SERPL-CCNC: 21 IU/L (ref 0–40)
BASOPHILS # BLD AUTO: 0.1 X10E3/UL (ref 0–0.2)
BASOPHILS NFR BLD AUTO: 1 %
BILIRUB SERPL-MCNC: 0.4 MG/DL (ref 0–1.2)
BUN SERPL-MCNC: 14 MG/DL (ref 8–27)
BUN/CREAT SERPL: 16 (ref 12–28)
CALCIUM SERPL-MCNC: 9.5 MG/DL (ref 8.7–10.3)
CHLORIDE SERPL-SCNC: 103 MMOL/L (ref 96–106)
CO2 SERPL-SCNC: 24 MMOL/L (ref 20–29)
CREAT SERPL-MCNC: 0.85 MG/DL (ref 0.57–1)
EGFRCR SERPLBLD CKD-EPI 2021: 73 ML/MIN/1.73
EOSINOPHIL # BLD AUTO: 0.1 X10E3/UL (ref 0–0.4)
EOSINOPHIL NFR BLD AUTO: 3 %
ERYTHROCYTE [DISTWIDTH] IN BLOOD BY AUTOMATED COUNT: 14.4 % (ref 11.7–15.4)
FERRITIN SERPL-MCNC: 10 NG/ML (ref 15–150)
FOLATE BLD-MCNC: 323 NG/ML
FOLATE RBC-MCNC: 883 NG/ML
GLOBULIN SER CALC-MCNC: 2.9 G/DL (ref 1.5–4.5)
GLUCOSE SERPL-MCNC: 92 MG/DL (ref 70–99)
HCT VFR BLD AUTO: 36.6 % (ref 34–46.6)
HGB BLD-MCNC: 11.1 G/DL (ref 11.1–15.9)
IMM GRANULOCYTES # BLD AUTO: 0 X10E3/UL (ref 0–0.1)
IMM GRANULOCYTES NFR BLD AUTO: 0 %
IRON SATN MFR SERPL: 8 % (ref 15–55)
IRON SERPL-MCNC: 31 UG/DL (ref 27–139)
LYMPHOCYTES # BLD AUTO: 1.3 X10E3/UL (ref 0.7–3.1)
LYMPHOCYTES NFR BLD AUTO: 22 %
MCH RBC QN AUTO: 25.5 PG (ref 26.6–33)
MCHC RBC AUTO-ENTMCNC: 30.3 G/DL (ref 31.5–35.7)
MCV RBC AUTO: 84 FL (ref 79–97)
MONOCYTES # BLD AUTO: 0.5 X10E3/UL (ref 0.1–0.9)
MONOCYTES NFR BLD AUTO: 10 %
NEUTROPHILS # BLD AUTO: 3.7 X10E3/UL (ref 1.4–7)
NEUTROPHILS NFR BLD AUTO: 64 %
PLATELET # BLD AUTO: 228 X10E3/UL (ref 150–450)
POTASSIUM SERPL-SCNC: 4.7 MMOL/L (ref 3.5–5.2)
PROT SERPL-MCNC: 7 G/DL (ref 6–8.5)
RBC # BLD AUTO: 4.35 X10E6/UL (ref 3.77–5.28)
SODIUM SERPL-SCNC: 140 MMOL/L (ref 134–144)
TIBC SERPL-MCNC: 408 UG/DL (ref 250–450)
UIBC SERPL-MCNC: 377 UG/DL (ref 118–369)
VIT B12 SERPL-MCNC: 218 PG/ML (ref 232–1245)
WBC # BLD AUTO: 5.7 X10E3/UL (ref 3.4–10.8)

## 2024-10-11 NOTE — PROGRESS NOTES
10/11/24        Please tell her that her lab work shows that she is iron deficient.  Her hemoglobin is 11.1 which is in the low normal range.  She also has vitamin B12 deficiency.  The other labs look good.  I would recommend that she start taking iron supplement and a vitamin B12 supplement.  She can buy over-the-counter iron sulfate 325 mg.  I would have her take 1 by mouth daily.  I would also take this with some vitamin C to increase the absorption of the iron.  I would also have her buy an over-the-counter vitamin B12 pill.  She could take vitamin B12 1000 mcg 1 p.o. daily.  I would have her follow-up in 4 to 6 weeks with her PCP to have a CBC, ferritin level, and vitamin B12 level redrawn.       We will see what the CAT scan of the abdomen and pelvis, the chest x-ray, the EGD and the colonoscopy show?       Please send a copy of this report to her PCP.  Camacho martin

## 2024-10-11 NOTE — TELEPHONE ENCOUNTER
----- Message from Clive Castro sent at 10/11/2024 12:20 PM EDT -----  10/11/24        Please tell her that her lab work shows that she is iron deficient.  Her hemoglobin is 11.1 which is in the low normal range.  She also has vitamin B12 deficiency.  The other labs look good.  I would recommend that she start taking iron supplement and a vitamin B12 supplement.  She can buy over-the-counter iron sulfate 325 mg.  I would have her take 1 by mouth daily.  I would also take this with some vitamin C to increase the absorption of the iron.  I would also have her buy an over-the-counter vitamin B12 pill.  She could take vitamin B12 1000 mcg 1 p.o. daily.  I would have her follow-up in 4 to 6 weeks with her PCP to have a CBC, ferritin level, and vitamin B12 level redrawn.       We will see what the CAT scan of the abdomen and pelvis, the chest x-ray, the EGD and the colonoscopy show?       Please send a copy of this report to her PCP.  Camacho martin

## 2024-10-11 NOTE — TELEPHONE ENCOUNTER
Patient notified of results and recommendations and verbalized understanding    Results routed to the PCP via epic

## 2024-10-30 ENCOUNTER — HOSPITAL ENCOUNTER (OUTPATIENT)
Dept: CT IMAGING | Facility: HOSPITAL | Age: 69
Discharge: HOME | End: 2024-10-30
Payer: MEDICARE

## 2024-10-30 ENCOUNTER — HOSPITAL ENCOUNTER (OUTPATIENT)
Dept: GENERAL RADIOLOGY | Facility: HOSPITAL | Age: 69
Discharge: HOME | End: 2024-10-30
Payer: MEDICARE

## 2024-10-30 DIAGNOSIS — D12.6 ADENOMATOUS POLYP OF COLON, UNSPECIFIED PART OF COLON: ICD-10-CM

## 2024-10-30 DIAGNOSIS — K62.5 RECTAL BLEEDING: ICD-10-CM

## 2024-10-30 DIAGNOSIS — D64.9 ANEMIA, UNSPECIFIED TYPE: ICD-10-CM

## 2024-10-30 DIAGNOSIS — R19.7 DIARRHEA, UNSPECIFIED TYPE: ICD-10-CM

## 2024-10-30 DIAGNOSIS — R10.32 LEFT LOWER QUADRANT ABDOMINAL PAIN: ICD-10-CM

## 2024-10-30 DIAGNOSIS — R06.00 DYSPNEA, UNSPECIFIED TYPE: ICD-10-CM

## 2024-10-30 PROCEDURE — 0 DIATRIZOATE MEGLUMINE & SODIUM PER 1 ML: Performed by: INTERNAL MEDICINE

## 2024-10-30 PROCEDURE — 25510000001 IOPAMIDOL 61 % SOLUTION: Performed by: INTERNAL MEDICINE

## 2024-10-30 PROCEDURE — 74177 CT ABD & PELVIS W/CONTRAST: CPT

## 2024-10-30 PROCEDURE — 71046 X-RAY EXAM CHEST 2 VIEWS: CPT

## 2024-10-30 RX ORDER — IOPAMIDOL 612 MG/ML
100 INJECTION, SOLUTION INTRAVASCULAR
Status: COMPLETED | OUTPATIENT
Start: 2024-10-30 | End: 2024-10-30

## 2024-10-30 RX ORDER — DIATRIZOATE MEGLUMINE AND DIATRIZOATE SODIUM 660; 100 MG/ML; MG/ML
30 SOLUTION ORAL; RECTAL
Status: COMPLETED | OUTPATIENT
Start: 2024-10-30 | End: 2024-10-30

## 2024-10-30 RX ADMIN — DIATRIZOATE MEGLUMINE AND DIATRIZOATE SODIUM 30 ML: 660; 100 LIQUID ORAL; RECTAL at 12:30

## 2024-10-30 RX ADMIN — IOPAMIDOL 85 ML: 612 INJECTION, SOLUTION INTRAVENOUS at 13:53

## 2024-11-01 ENCOUNTER — TELEPHONE (OUTPATIENT)
Dept: GASTROENTEROLOGY | Facility: CLINIC | Age: 69
End: 2024-11-01

## 2024-11-01 DIAGNOSIS — R93.5 ABNORMAL CT OF THE ABDOMEN: Primary | ICD-10-CM

## 2024-11-01 DIAGNOSIS — R06.00 DYSPNEA, UNSPECIFIED TYPE: ICD-10-CM

## 2024-11-01 NOTE — TELEPHONE ENCOUNTER
Pt stated she is experiencing back pain as well as a decrease in urination.  She was wondering if the CT or the xray showed anything in her kidneys?  Advised results are not back yet.    I did advised she reach out to her PCP or go to an urgent care to r/o UTI    I have called the radiology department to see if they can get these results read, spoke to Vanessa and she was going to pull this to the radiologist's attention.

## 2024-11-01 NOTE — TELEPHONE ENCOUNTER
Hub staff attempted to follow warm transfer process and was unsuccessful     Caller: Coleen Zapien    Relationship to patient: Self    Best call back number: [phone]    Patient is needing: PT IS REQUESTING A CALL BACK FROM OFFICE ASAP. PT BACK IS HURTING SHE DOES NOT KNOW IF IT IS HER KIDNEY. SHE ALSO WANTS TO KNOW IF HER CT AND XRAY RESULTS ARE BACK. PLEASE GIVE PT A CALL BACK AND IF NOT ABLE TO REACH PT. IT IS OKAY TO M.

## 2024-11-03 NOTE — PROGRESS NOTES
11/03/24       Please call her and make sure that I did call her with the results of this CT of the abdomen and pelvis.  I believe I just did.  I also believe that I ordered a CT of the chest to evaluate the lung nodularity and I referred her to one of the pulmonary doctors.  Please make sure that the CT of the chest was ordered and that she is going to see one of the pulmonary doctors?.  Also make sure that a copy of this CAT scan of the abdomen and pelvis was sent to her PCP.  Camacho martin

## 2024-11-04 ENCOUNTER — TELEPHONE (OUTPATIENT)
Dept: GASTROENTEROLOGY | Facility: CLINIC | Age: 69
End: 2024-11-04
Payer: MEDICARE

## 2024-11-04 NOTE — TELEPHONE ENCOUNTER
Caller: Coleen Zapien    Relationship: Self    Best call back number: [phone]    What test was performed: CAT SCAN AND XRAY    When was the test performed: NOV.11TH    Where was the test performed: [facility]    Additional notes: PATIENT ASKED TO BE CALLED BACK TODAY BEFORE NOON PLEASE

## 2024-11-04 NOTE — TELEPHONE ENCOUNTER
Vm left for pt to call back to see if Dr Castro reviewed results with her.   Ct order is in  Referral is in and message to José Miguel to process

## 2024-11-04 NOTE — TELEPHONE ENCOUNTER
----- Message from Clive Castro sent at 11/3/2024  5:32 PM EST -----  11/03/24       Please call her and make sure that I did call her with the results of this CT of the abdomen and pelvis.  I believe I just did.  I also believe that I ordered a CT of the chest to evaluate the lung nodularity and I referred her to one of the pulmonary doctors.  Please make sure that the CT of the chest was ordered and that she is going to see one of the pulmonary doctors?.  Also make sure that a copy of this CAT scan of the abdomen and pelvis was sent to her PCP.  Noemi. kjh

## 2024-11-04 NOTE — TELEPHONE ENCOUNTER
Returned call to pt.  She wanted me to review her results with her, which I have done.  She has an apt with her PCP this afternoon for a persistent cough.  Advised I will fax this result for her.  I have faxed to [phone], confirmation received

## 2024-11-05 ENCOUNTER — TELEPHONE (OUTPATIENT)
Dept: GASTROENTEROLOGY | Facility: CLINIC | Age: 69
End: 2024-11-05
Payer: MEDICARE

## 2024-11-05 ENCOUNTER — TRANSCRIBE ORDERS (OUTPATIENT)
Dept: ADMINISTRATIVE | Facility: HOSPITAL | Age: 69
End: 2024-11-05
Payer: MEDICARE

## 2024-11-05 DIAGNOSIS — R91.8 OTHER NONSPECIFIC ABNORMAL FINDING OF LUNG FIELD: Primary | ICD-10-CM

## 2024-11-05 NOTE — TELEPHONE ENCOUNTER
Caller: Coleen Zapien    Relationship to patient: Self    Best call back number: [phone]    Patient is needing: doesn't need to set up appt. For ct scan with us. Family doctor is going to set that up for pt.

## 2024-11-05 NOTE — TELEPHONE ENCOUNTER
Hub staff attempted to follow warm transfer process and was unsuccessful     Caller: ROSMERY LOCKETT     Relationship to patient: SELF    Best call back number: [phone]     Patient is needing: PT HAS AN EGD AND COLONOSCOPY SCHEDULED FOR 11/19/24 AND SHE IS WONDERING IF SHE CAN GET THAT MOVED UP DUE TO SHE FEELS SHE MIGHT HAVE THROAT CANCER AND SHE DOESN'T WANT TO WAIT 2 WEEKS PLEASE ADVISE AND CALL PT BACK

## 2024-11-06 ENCOUNTER — TELEPHONE (OUTPATIENT)
Dept: GASTROENTEROLOGY | Facility: CLINIC | Age: 69
End: 2024-11-06
Payer: MEDICARE

## 2024-11-06 NOTE — TELEPHONE ENCOUNTER
SW PT COMMUNICATED I WOULD PLACE ON CANCELLATION LIST PT VERBALIZES UNDERSTANDING OK FOR HUB TO RELAY

## 2024-11-06 NOTE — TELEPHONE ENCOUNTER
SOLOMON OVALLES IN SCHEDULING PT R/S TO 11/08/2024 ARRIVING AT 0900AM PT VERBALIZES UNDERSTANDING AND STILL HAS HER MIRALAX PREP INFORMATION OK FOR HUB TO RELAY

## 2024-11-06 NOTE — TELEPHONE ENCOUNTER
She had her EGD and colonoscopy scheduled for 11/19/24 but wants it done sooner. Could we reschedule her to have her EGD and colonsocopy on 11/8/24 to follow the other cases that are scheduled then? Thx.kj           Message above forwarded to Mary in scheduling.  
Called pt and she reports that her pcp's office will not get a PA for ct chest since she needs to get this done at [facility].  Pt currently has 2 orders in chart .  One order per Dr Castro is with contrast and the other from her pcp is without contrast.     Pt's information regarding getting this scheduled is very hard to follow.  Advised pt we will call scheduling and see if she can get this scheduled at [facility].     Called [facility] scheduling at [phone] and spoke with Suzanna and got pt scheduled for ct of chest with contrast on 11/29 at 3p .  Pt is arrive to at 245p.   Advised to have liquids only 4 hrs prior.  Verb understanding.     Update sent to Dr Castro.     Pt does report swallowing issues and lots of burning in her throat and is hoping to get scopes moved up.       
Caller: ROSMERY LOCKETT     Relationship: SELF    Best call back number: [phone]    What orders are you requesting (i.e. lab or imaging): CT CHEST     In what timeframe would the patient need to come in: ASAP    Where will you receive your lab/imaging services: [facility]    Additional notes: PATIENT STATES SHE IS NEEDING DR. KAM CLINICAL STAFF TO WORK ON THE CT CHEST ORDER DUE TO HER PCP NOT BEING ABLE TO GET IT APPROVED FOR THE [facility] LOCATION. PATIENT WOULD LIKE A CALL BACK REGARDING THIS. PATIENT STATES SHE IS ALSO NEEDING TO SPEAK TO SOMEONE REGARDING HER ENDOSCOPY. SHE STATES SHE IS HAVING SOME ISSUES AND IS WANTING TO KNOW IF THAT COULD BE MOVED TO AN EARLIER DATE THAN 11/19/24. PLEASE ADVISE.      
SOLOMON OVALLES IN SCHEDULING PT R/S TO 11/08/2024 ARRIVING AT 0900AM PT VERBALIZES UNDERSTANDING AND STILL HAS HER [facility] PREP INFORMATION OK FOR HUB TO RELAY  
denies pain/discomfort

## 2024-11-07 ENCOUNTER — TELEPHONE (OUTPATIENT)
Dept: GASTROENTEROLOGY | Facility: CLINIC | Age: 69
End: 2024-11-07

## 2024-11-07 NOTE — TELEPHONE ENCOUNTER
Hub staff attempted to follow warm transfer process and was unsuccessful     Caller: Coleen Zapien    Relationship to patient: Self    Best call back number: [phone]    Patient is needing: TOOK IRON SUPPLEMENT 325 MCG YESTERDAY. SHE HAS BEEN TAKING SUPPLEMENT FOR 1 WEEK. PLEASE CALL BACK ASAP TO ADVISE HER PROCEDURE IS SCHEDULED FOR TOMORROW, 11/7/24 WITH AN ARRIVAL TIME OF 9 AM.

## 2024-11-08 ENCOUNTER — ANESTHESIA (OUTPATIENT)
Dept: GASTROENTEROLOGY | Facility: HOSPITAL | Age: 69
End: 2024-11-08
Payer: MEDICARE

## 2024-11-08 ENCOUNTER — HOSPITAL ENCOUNTER (OUTPATIENT)
Facility: HOSPITAL | Age: 69
Setting detail: HOSPITAL OUTPATIENT SURGERY
Discharge: HOME | End: 2024-11-08
Attending: INTERNAL MEDICINE | Admitting: INTERNAL MEDICINE
Payer: MEDICARE

## 2024-11-08 ENCOUNTER — TELEPHONE (OUTPATIENT)
Dept: GASTROENTEROLOGY | Facility: CLINIC | Age: 69
End: 2024-11-08

## 2024-11-08 ENCOUNTER — ANESTHESIA EVENT (OUTPATIENT)
Dept: GASTROENTEROLOGY | Facility: HOSPITAL | Age: 69
End: 2024-11-08
Payer: MEDICARE

## 2024-11-08 VITALS
SYSTOLIC BLOOD PRESSURE: 140 MMHG | RESPIRATION RATE: 16 BRPM | HEART RATE: 61 BPM | HEIGHT: 67 IN | BODY MASS INDEX: 31.39 KG/M2 | WEIGHT: 200 LBS | DIASTOLIC BLOOD PRESSURE: 8 MMHG | OXYGEN SATURATION: 95 %

## 2024-11-08 DIAGNOSIS — D12.6 ADENOMATOUS POLYP OF COLON, UNSPECIFIED PART OF COLON: ICD-10-CM

## 2024-11-08 DIAGNOSIS — R10.32 LEFT LOWER QUADRANT ABDOMINAL PAIN: ICD-10-CM

## 2024-11-08 DIAGNOSIS — D64.9 ANEMIA, UNSPECIFIED TYPE: Primary | ICD-10-CM

## 2024-11-08 DIAGNOSIS — K62.5 RECTAL BLEEDING: ICD-10-CM

## 2024-11-08 DIAGNOSIS — R06.00 DYSPNEA, UNSPECIFIED TYPE: ICD-10-CM

## 2024-11-08 DIAGNOSIS — R19.7 DIARRHEA, UNSPECIFIED TYPE: ICD-10-CM

## 2024-11-08 DIAGNOSIS — D64.9 ANEMIA, UNSPECIFIED TYPE: ICD-10-CM

## 2024-11-08 DIAGNOSIS — R13.10 DYSPHAGIA, UNSPECIFIED TYPE: ICD-10-CM

## 2024-11-08 DIAGNOSIS — Z80.0 FH: COLON CANCER: ICD-10-CM

## 2024-11-08 DIAGNOSIS — Z83.719 FH: COLON POLYPS: ICD-10-CM

## 2024-11-08 PROCEDURE — 25810000003 LACTATED RINGERS PER 1000 ML: Performed by: INTERNAL MEDICINE

## 2024-11-08 PROCEDURE — 88342 IMHCHEM/IMCYTCHM 1ST ANTB: CPT | Performed by: INTERNAL MEDICINE

## 2024-11-08 PROCEDURE — 45380 COLONOSCOPY AND BIOPSY: CPT | Performed by: INTERNAL MEDICINE

## 2024-11-08 PROCEDURE — 25010000002 PROPOFOL 200 MG/20ML EMULSION: Performed by: NURSE ANESTHETIST, CERTIFIED REGISTERED

## 2024-11-08 PROCEDURE — 25010000002 PHENYLEPHRINE 10 MG/ML SOLUTION: Performed by: NURSE ANESTHETIST, CERTIFIED REGISTERED

## 2024-11-08 PROCEDURE — 88305 TISSUE EXAM BY PATHOLOGIST: CPT | Performed by: INTERNAL MEDICINE

## 2024-11-08 PROCEDURE — 25010000002 PROPOFOL 1000 MG/100ML EMULSION: Performed by: NURSE ANESTHETIST, CERTIFIED REGISTERED

## 2024-11-08 PROCEDURE — 88341 IMHCHEM/IMCYTCHM EA ADD ANTB: CPT | Performed by: INTERNAL MEDICINE

## 2024-11-08 PROCEDURE — 25010000002 LIDOCAINE 2% SOLUTION: Performed by: NURSE ANESTHETIST, CERTIFIED REGISTERED

## 2024-11-08 PROCEDURE — 43239 EGD BIOPSY SINGLE/MULTIPLE: CPT | Performed by: INTERNAL MEDICINE

## 2024-11-08 RX ORDER — SODIUM CHLORIDE, SODIUM LACTATE, POTASSIUM CHLORIDE, CALCIUM CHLORIDE 600; 310; 30; 20 MG/100ML; MG/100ML; MG/100ML; MG/100ML
30 INJECTION, SOLUTION INTRAVENOUS CONTINUOUS PRN
Status: DISCONTINUED | OUTPATIENT
Start: 2024-11-08 | End: 2024-11-08 | Stop reason: HOSPADM

## 2024-11-08 RX ORDER — PHENYLEPHRINE HYDROCHLORIDE 10 MG/ML
INJECTION INTRAVENOUS AS NEEDED
Status: DISCONTINUED | OUTPATIENT
Start: 2024-11-08 | End: 2024-11-08 | Stop reason: SURG

## 2024-11-08 RX ORDER — LIDOCAINE HYDROCHLORIDE 20 MG/ML
INJECTION, SOLUTION INFILTRATION; PERINEURAL AS NEEDED
Status: DISCONTINUED | OUTPATIENT
Start: 2024-11-08 | End: 2024-11-08 | Stop reason: SURG

## 2024-11-08 RX ORDER — PROPOFOL 10 MG/ML
INJECTION, EMULSION INTRAVENOUS CONTINUOUS PRN
Status: DISCONTINUED | OUTPATIENT
Start: 2024-11-08 | End: 2024-11-08 | Stop reason: SURG

## 2024-11-08 RX ORDER — PROPOFOL 10 MG/ML
INJECTION, EMULSION INTRAVENOUS AS NEEDED
Status: DISCONTINUED | OUTPATIENT
Start: 2024-11-08 | End: 2024-11-08 | Stop reason: SURG

## 2024-11-08 RX ADMIN — PROPOFOL 160 MCG/KG/MIN: 10 INJECTION, EMULSION INTRAVENOUS at 10:17

## 2024-11-08 RX ADMIN — LIDOCAINE HYDROCHLORIDE 3 ML: 20 INJECTION, SOLUTION INFILTRATION; PERINEURAL at 10:17

## 2024-11-08 RX ADMIN — PHENYLEPHRINE HYDROCHLORIDE 100 MCG: 10 INJECTION INTRAVENOUS at 10:41

## 2024-11-08 RX ADMIN — PHENYLEPHRINE HYDROCHLORIDE 100 MCG: 10 INJECTION INTRAVENOUS at 10:31

## 2024-11-08 RX ADMIN — PROPOFOL INJECTABLE EMULSION 50 MG: 10 INJECTION, EMULSION INTRAVENOUS at 10:18

## 2024-11-08 RX ADMIN — SODIUM CHLORIDE, POTASSIUM CHLORIDE, SODIUM LACTATE AND CALCIUM CHLORIDE: 600; 310; 30; 20 INJECTION, SOLUTION INTRAVENOUS at 09:47

## 2024-11-08 RX ADMIN — PROPOFOL INJECTABLE EMULSION 100 MG: 10 INJECTION, EMULSION INTRAVENOUS at 10:17

## 2024-11-08 NOTE — ANESTHESIA PREPROCEDURE EVALUATION
Anesthesia Evaluation     Patient summary reviewed and Nursing notes reviewed   NPO Solid Status: > 8 hours             Airway   Mallampati: II  TM distance: >3 FB  Neck ROM: full  no difficulty expected  Dental - normal exam     Pulmonary - normal exam   (+) ,shortness of breath  Cardiovascular - normal exam    (+) hypertension, valvular problems/murmurs murmur, hyperlipidemia      Neuro/Psych  (+) psychiatric history Anxiety and Depression  GI/Hepatic/Renal/Endo    (+) hiatal hernia, GERD, thyroid problem     Musculoskeletal     Abdominal  - normal exam   Substance History      OB/GYN          Other                    Anesthesia Plan    ASA 2     MAC     intravenous induction     Anesthetic plan, risks, benefits, and alternatives have been provided, discussed and informed consent has been obtained with: patient.    CODE STATUS:

## 2024-11-08 NOTE — H&P
"Patient presents with    Diarrhea    Black or Bloody Stool    Abdominal Cramping    Abdominal Pain         History of Present Illness:   69 y.o. female who c/o 3 weeks ago had bloody diarrhea with cramping. She went to the [facility] ER and they did labs and sent her home. In 5/24 her hub had surgery for colon cancer. She now has some LLQ abd aching with radiation to her lower back. Her stool may be pencil thin. No diarrhea now. NO constipation. No rectal bleeding or melena now. No fevers, chills. +night sweats. No chest pain. +SOA. Weight stable. No heartburn. +dysphagia.        She last had an EGD and colonoscopy by me in 9 of 2022.  I recommended a repeat colonoscopy in 5 years.     Medical History        Past Medical History:   Diagnosis Date    Abdominal pain       LEFT SIDE    Abrasion       BILATERAL ARM, PT \"PICKS\" AT ARMS    Anxiety and depression      Arthritis      Disease of thyroid gland      Enlarged lymph nodes      Hiatal hernia      History of kidney stones      IBS (irritable bowel syndrome)      Interstitial cystitis      PVC (premature ventricular contraction)       BEEN ON MEDS FOR ONE YEAR    Tinnitus      Torn meniscus       RIGHT KNEE            Surgical History         Past Surgical History:   Procedure Laterality Date    BREAST SURGERY Bilateral       BIOPSIES    CATARACT EXTRACTION Bilateral      COLONOSCOPY         2010    COLONOSCOPY N/A 08/18/2016     Procedure: COLONOSCOPY INTO CECUM AND TI WITH COLD BIOPSY POLYPECTOMIES;  Surgeon: Clive Castro MD;  Location: [facility] ENDOSCOPY;  Service:     COLONOSCOPY N/A 01/29/2019     Procedure: COLONOSCOPY TO CECUM WITH POLYPECTOMIES;  Surgeon: Clive Castro MD;  Location: [facility] ENDOSCOPY;  Service: Gastroenterology    COLONOSCOPY N/A 09/30/2022     Procedure: COLONOSCOPY TO CECUM AND TI;  Surgeon: Clive Castro MD;  Location: [facility] ENDOSCOPY;  Service: Gastroenterology;  Laterality: N/A;  Pre: history of colon polyps, change in bowel habits, " family history of colon cancer  Post: DIVERTICULOSIS, INTERNAL HEMORRHOIDS    DIAGNOSTIC LAPAROSCOPY EXPLORATORY LAPAROTOMY        ENDOSCOPY N/A 09/30/2022     Procedure: ESOPHAGOGASTRODUODENOSCOPY with biopsies;  Surgeon: Clive Castro MD;  Location: [facility] ENDOSCOPY;  Service: Gastroenterology;  Laterality: N/A;  Pre: nausea  Post: hiatal hernia, gastritis    EXTRACORPOREAL SHOCK WAVE LITHOTRIPSY (ESWL)        LAPAROSCOPIC CHOLECYSTECTOMY        LYMPH NODE BIOPSY                  Current Medications      Current Outpatient Medications:     aspirin 81 MG EC tablet, Take 1 tablet by mouth Daily., Disp: , Rfl:     atorvastatin (LIPITOR) 20 MG tablet, Take 1 tablet by mouth Daily., Disp: , Rfl:     escitalopram (LEXAPRO) 20 MG tablet, Take 1 tablet by mouth Daily., Disp: , Rfl:     furosemide (LASIX) 20 MG tablet, Take 1 tablet by mouth As Needed., Disp: , Rfl:     metoprolol tartrate (LOPRESSOR) 50 MG tablet, Take 2 tablets by mouth Daily., Disp: , Rfl:     nitroglycerin (NITROSTAT) 0.4 MG SL tablet, Place 1 tablet under the tongue Every 5 (Five) Minutes As Needed., Disp: , Rfl:     Synthroid 100 MCG tablet, Take 1 tablet by mouth Daily., Disp: , Rfl:         Allergies         Allergies   Allergen Reactions    Bee Venom Anaphylaxis       Other reaction(s): Decreased blood pressure    Influenza Virus Vaccine Rash       SWELLING AND RASH    Pneumococcal Vaccines Hives, Rash and Swelling                  Family History   Problem Relation Age of Onset    Colon cancer Mother      Colon polyps Brother      Malig Hyperthermia Neg Hx           Social History   Social History           Socioeconomic History    Marital status:    Tobacco Use    Smoking status: Never    Smokeless tobacco: Never   Vaping Use    Vaping status: Never Used   Substance and Sexual Activity    Alcohol use: No    Drug use: No    Sexual activity: Defer            Review of Systems   Gastrointestinal:  Negative for abdominal pain.   All other  systems reviewed and are negative.     Pertinent positives and negatives documented in the HPI and all other systems reviewed and were found to be negative.      Vitals:     10/10/24 0855   BP: 151/79   Pulse: 76   Temp: 97.3 °F (36.3 °C)         Physical Exam  Vitals reviewed.   Constitutional:       General: She is not in acute distress.     Appearance: Normal appearance. She is well-developed. She is not diaphoretic.   HENT:      Head: Normocephalic and atraumatic. Hair is normal.      Right Ear: Hearing, tympanic membrane, ear canal and external ear normal. No decreased hearing noted. No drainage.      Left Ear: Hearing, tympanic membrane, ear canal and external ear normal. No decreased hearing noted.      Nose: Nose normal. No nasal deformity.      Mouth/Throat:      Mouth: Mucous membranes are moist.   Eyes:      General: Lids are normal.         Right eye: No discharge.         Left eye: No discharge.      Extraocular Movements: Extraocular movements intact.      Conjunctiva/sclera: Conjunctivae normal.      Pupils: Pupils are equal, round, and reactive to light.   Neck:      Thyroid: No thyromegaly.      Vascular: No JVD.      Trachea: No tracheal deviation.   Cardiovascular:      Rate and Rhythm: Normal rate and regular rhythm.      Pulses: Normal pulses.      Heart sounds: Normal heart sounds. No murmur heard.     No friction rub. No gallop.   Pulmonary:      Effort: Pulmonary effort is normal. No respiratory distress.      Breath sounds: Normal breath sounds. No wheezing or rales.   Chest:      Chest wall: No tenderness.   Abdominal:      General: Bowel sounds are normal. There is no distension.      Palpations: Abdomen is soft. There is no mass.      Tenderness: There is no abdominal tenderness. There is no guarding or rebound.      Hernia: No hernia is present.   Genitourinary:     Rectum: Normal. Guaiac result negative.   Musculoskeletal:         General: No tenderness or deformity. Normal range of  motion.      Cervical back: Normal range of motion and neck supple.   Lymphadenopathy:      Cervical: No cervical adenopathy.   Skin:     General: Skin is warm and dry.      Findings: No erythema or rash.   Neurological:      Mental Status: She is alert and oriented to person, place, and time.      Cranial Nerves: No cranial nerve deficit.      Motor: No abnormal muscle tone.      Coordination: Coordination normal.      Deep Tendon Reflexes: Reflexes are normal and symmetric. Reflexes normal.   Psychiatric:         Mood and Affect: Mood normal.         Behavior: Behavior normal.         Thought Content: Thought content normal.         Judgment: Judgment normal.            Diagnoses and all orders for this visit:     1. Anemia, unspecified type (Primary)  -     CBC & Differential  -     Comprehensive Metabolic Panel  -     Ferritin  -     Folate RBC  -     Iron Profile  -     Vitamin B12  -     XR chest pa and lateral; Future  -     CT Abdomen Pelvis With Contrast; Future  -     Case Request; Standing  -     Case Request     2. FH: colon cancer  -     CBC & Differential  -     Comprehensive Metabolic Panel  -     Ferritin  -     Folate RBC  -     Iron Profile  -     Vitamin B12  -     Case Request; Standing  -     Case Request     3. FH: colon polyps  -     CBC & Differential  -     Comprehensive Metabolic Panel  -     Ferritin  -     Folate RBC  -     Iron Profile  -     Vitamin B12  -     Case Request; Standing  -     Case Request     4. Adenomatous polyp of colon, unspecified part of colon  -     CBC & Differential  -     Comprehensive Metabolic Panel  -     Ferritin  -     Folate RBC  -     Iron Profile  -     Vitamin B12  -     XR chest pa and lateral; Future  -     CT Abdomen Pelvis With Contrast; Future  -     Case Request; Standing  -     Case Request     5. Diarrhea, unspecified type  -     CBC & Differential  -     Comprehensive Metabolic Panel  -     Ferritin  -     Folate RBC  -     Iron Profile  -      "Vitamin B12  -     XR chest pa and lateral; Future  -     CT Abdomen Pelvis With Contrast; Future  -     Case Request; Standing  -     Case Request     6. Rectal bleeding  -     CBC & Differential  -     Comprehensive Metabolic Panel  -     Ferritin  -     Folate RBC  -     Iron Profile  -     Vitamin B12  -     XR chest pa and lateral; Future  -     CT Abdomen Pelvis With Contrast; Future  -     Case Request; Standing  -     Case Request     7. Left lower quadrant abdominal pain  -     XR chest pa and lateral; Future  -     CT Abdomen Pelvis With Contrast; Future  -     Case Request; Standing  -     Case Request     8. Dyspnea, unspecified type  -     XR chest pa and lateral; Future  -     Case Request; Standing  -     Case Request     9. Dysphagia, unspecified type  -     Case Request; Standing  -     Case Request     Other orders  -     Implement Anesthesia orders day of procedure.; Standing  -     Follow Anesthesia Guidelines / Protocol; Future  -     Verify bowel prep was successful; Standing  -     Give tap water enema if bowel prep was insufficient; Standing        Assessment:  History (mother) of colon cancer.  History of colon polyps.  Family history (brother) of colon polyps.  LLQ abd pain  Bloody diarrhea that has resolved.  SOA  H/o anemia  Dysphagia        Recommendations:  CT abd/pelvis  Labs  CXR \"SOA\"  EGD and colonoscopy.        No follow-ups on file.     11/8/24 - No change from the above H and P.    Clive Castro MD      "

## 2024-11-08 NOTE — TELEPHONE ENCOUNTER
Called pt and on vm advised of Dr Castro's note. Advised to call back and make lab appt.     Lab orders placed as requested per Dr Castro.

## 2024-11-08 NOTE — ANESTHESIA POSTPROCEDURE EVALUATION
"Patient: Coleen Zapien    Procedure Summary       Date: 11/08/24 Room / Location: [facility] ENDOSCOPY 10 / [facility] ENDOSCOPY    Anesthesia Start: 1012 Anesthesia Stop: 1052    Procedures:       ESOPHAGOGASTRODUODENOSCOPY WITH BIOPSIES (Esophagus)      COLONOSCOPY TO CECUM AND TERMINAL ILEUM WITH COLD BIOPSY POLYPECTOMY AND BIOPSIES Diagnosis:       Anemia, unspecified type      FH: colon cancer      FH: colon polyps      Adenomatous polyp of colon, unspecified part of colon      Diarrhea, unspecified type      Rectal bleeding      Left lower quadrant abdominal pain      Dyspnea, unspecified type      Dysphagia, unspecified type      (Anemia, unspecified type [D64.9])      (FH: colon cancer [Z80.0])      (FH: colon polyps [Z83.719])      (Adenomatous polyp of colon, unspecified part of colon [D12.6])      (Diarrhea, unspecified type [R19.7])      (Rectal bleeding [K62.5])      (Left lower quadrant abdominal pain [R10.32])      (Dyspnea, unspecified type [R06.00])      (Dysphagia, unspecified type [R13.10])    Surgeons: Clive Castro MD Provider: Camden Sanchez MD    Anesthesia Type: MAC ASA Status: 2            Anesthesia Type: MAC    Vitals  Vitals Value Taken Time   /80 11/08/24 1110   Temp     Pulse 64 11/08/24 1116   Resp 16 11/08/24 1109   SpO2 95 % 11/08/24 1115   Vitals shown include unfiled device data.        Post Anesthesia Care and Evaluation    Patient location during evaluation: bedside  Patient participation: complete - patient participated  Level of consciousness: awake and alert  Pain management: adequate    Airway patency: patent  Anesthetic complications: No anesthetic complications    Cardiovascular status: acceptable  Respiratory status: acceptable  Hydration status: acceptable    Comments: BP (!) 140/8 (BP Location: Left arm, Patient Position: Lying)   Pulse 61   Resp 16   Ht 170.2 cm (67\")   Wt 90.7 kg (200 lb)   SpO2 95%   BMI 31.32 kg/m²     "

## 2024-11-08 NOTE — TELEPHONE ENCOUNTER
"11/8/24 -please schedule her to have the following lab tests done in about 1 month:  - CBC, ferritin, TIBC, B12 (\"anemia\")  [facility]  "

## 2024-11-12 LAB
CYTO UR: NORMAL
DX PRELIMINARY: NORMAL
LAB AP CASE REPORT: NORMAL
LAB AP DIAGNOSIS COMMENT: NORMAL
LAB AP SPECIAL STAINS: NORMAL
PATH REPORT.FINAL DX SPEC: NORMAL
PATH REPORT.GROSS SPEC: NORMAL

## 2024-11-12 NOTE — TELEPHONE ENCOUNTER
Called pt and advised of Dr Catsro's note. Verb understanding.     Lab appt made for 12/11 at 1p.

## 2024-11-15 ENCOUNTER — TELEPHONE (OUTPATIENT)
Dept: GASTROENTEROLOGY | Facility: CLINIC | Age: 69
End: 2024-11-15

## 2024-11-15 NOTE — TELEPHONE ENCOUNTER
Caller: ROSMERY    Relationship: SELF    Best call back number: [phone]      What form or medical record are you requesting: X-RAY     Who is requesting this form or medical record from you: PCP DEVYN MANCUSO      Timeframe paperwork needed: ASAP    Additional notes: PATIENT STATED SHE NEEDS A COPY OF THE XRAY FAXED TO HER PCP.

## 2024-11-15 NOTE — TELEPHONE ENCOUNTER
Ct results faxed to Dr Philip at [phone] and fax confirmation received.     Called pt and advised of the above. Verb understanding.

## 2024-11-27 ENCOUNTER — TRANSCRIBE ORDERS (OUTPATIENT)
Dept: PULMONOLOGY | Facility: HOSPITAL | Age: 69
End: 2024-11-27
Payer: MEDICARE

## 2024-11-27 DIAGNOSIS — R93.89 ABNORMAL CT OF THE CHEST: Primary | ICD-10-CM

## 2024-11-29 ENCOUNTER — HOSPITAL ENCOUNTER (OUTPATIENT)
Dept: CT IMAGING | Facility: HOSPITAL | Age: 69
Discharge: HOME | End: 2024-11-29
Payer: MEDICARE

## 2024-11-29 DIAGNOSIS — R93.89 ABNORMAL CT OF THE CHEST: ICD-10-CM

## 2024-11-29 LAB — CREAT BLDA-MCNC: 1.3 MG/DL (ref 0.6–1.3)

## 2024-11-29 PROCEDURE — 82565 ASSAY OF CREATININE: CPT

## 2024-11-29 PROCEDURE — 71260 CT THORAX DX C+: CPT

## 2024-11-29 PROCEDURE — 25510000001 IOPAMIDOL 61 % SOLUTION: Performed by: INTERNAL MEDICINE

## 2024-11-29 RX ORDER — IOPAMIDOL 612 MG/ML
100 INJECTION, SOLUTION INTRAVASCULAR
Status: DISCONTINUED | OUTPATIENT
Start: 2024-11-29 | End: 2024-11-29

## 2024-11-29 RX ORDER — IOPAMIDOL 612 MG/ML
100 INJECTION, SOLUTION INTRAVASCULAR
Status: COMPLETED | OUTPATIENT
Start: 2024-11-29 | End: 2024-11-29

## 2024-11-29 RX ADMIN — IOPAMIDOL 75 ML: 612 INJECTION, SOLUTION INTRAVENOUS at 14:47

## 2024-12-04 ENCOUNTER — TELEPHONE (OUTPATIENT)
Dept: GASTROENTEROLOGY | Facility: CLINIC | Age: 69
End: 2024-12-04
Payer: MEDICARE

## 2024-12-04 NOTE — TELEPHONE ENCOUNTER
Hub staff attempted to follow warm transfer process and was unsuccessful     Caller: Coleen Zapien    Relationship to patient: Self    Best call back number: [phone]    Patient is needing: PT WOULD LIKE TO R/S LAB WORK FROM 12/11 TO 12/09.  IS THAT POSSIBLE.  PLEASE ADVISE

## 2024-12-04 NOTE — TELEPHONE ENCOUNTER
Hub staff attempted to follow warm transfer process and was unsuccessful     Caller:     Relationship to patient:     Best call back number: [phone]    Patient is needing: PT WANTED TO REQUEST DR. KAM TO CALL HER THIS AFTERNOON TO DISCUSS THE RESULTS OF HER CT SCAN.  PLEASE ADVISE

## 2024-12-05 ENCOUNTER — TELEPHONE (OUTPATIENT)
Dept: GASTROENTEROLOGY | Facility: CLINIC | Age: 69
End: 2024-12-05
Payer: MEDICARE

## 2024-12-05 DIAGNOSIS — K29.40 AUTOIMMUNE GASTRITIS: ICD-10-CM

## 2024-12-05 DIAGNOSIS — D64.9 ANEMIA, UNSPECIFIED TYPE: Primary | ICD-10-CM

## 2024-12-05 DIAGNOSIS — R93.5 ABNORMAL CT OF THE ABDOMEN: ICD-10-CM

## 2024-12-05 NOTE — TELEPHONE ENCOUNTER
----- Message from Clive Castro sent at 11/27/2024  4:43 PM EST -----  11/27/24       I discussed this pathology report with the pathologist.  I called the patient and discussed that with her.       I recommend that we get the following lab tests to rule out autoimmune gastritis:   - Intrinsic factor antibodies, vitamin B12 level, parietal cell antibodies, fasting gastrin level, CBC, ferritin, TIBC.       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       I recommend that she have a repeat colonoscopy in 5 yrs.        Clinical Pool #1: Please schedule her to have the above lab tests done.  She is supposed to have labs done next month in the office.  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        Please send a copy of this report to her PCP.   Camacho martin

## 2024-12-09 ENCOUNTER — TRANSCRIBE ORDERS (OUTPATIENT)
Dept: ADMINISTRATIVE | Facility: HOSPITAL | Age: 69
End: 2024-12-09
Payer: MEDICARE

## 2024-12-09 ENCOUNTER — LAB (OUTPATIENT)
Dept: GASTROENTEROLOGY | Facility: CLINIC | Age: 69
End: 2024-12-09
Payer: MEDICARE

## 2024-12-09 DIAGNOSIS — R59.0 MEDIASTINAL LYMPHADENOPATHY: ICD-10-CM

## 2024-12-09 DIAGNOSIS — R91.8 PULMONARY NODULES: Primary | ICD-10-CM

## 2024-12-09 LAB
BASOPHILS # BLD AUTO: 0.05 10*3/MM3 (ref 0–0.2)
BASOPHILS NFR BLD AUTO: 1 % (ref 0–1.5)
EOSINOPHIL # BLD AUTO: 0.15 10*3/MM3 (ref 0–0.4)
EOSINOPHIL NFR BLD AUTO: 2.9 % (ref 0.3–6.2)
ERYTHROCYTE [DISTWIDTH] IN BLOOD BY AUTOMATED COUNT: 15 % (ref 12.3–15.4)
FERRITIN SERPL-MCNC: 20.7 NG/ML (ref 13–150)
HCT VFR BLD AUTO: 39.9 % (ref 34–46.6)
HGB BLD-MCNC: 12.8 G/DL (ref 12–15.9)
IMM GRANULOCYTES # BLD AUTO: 0.04 10*3/MM3 (ref 0–0.05)
IMM GRANULOCYTES NFR BLD AUTO: 0.8 % (ref 0–0.5)
LYMPHOCYTES # BLD AUTO: 1.31 10*3/MM3 (ref 0.7–3.1)
LYMPHOCYTES NFR BLD AUTO: 25.2 % (ref 19.6–45.3)
MCH RBC QN AUTO: 26.8 PG (ref 26.6–33)
MCHC RBC AUTO-ENTMCNC: 32.1 G/DL (ref 31.5–35.7)
MCV RBC AUTO: 83.5 FL (ref 79–97)
MONOCYTES # BLD AUTO: 0.37 10*3/MM3 (ref 0.1–0.9)
MONOCYTES NFR BLD AUTO: 7.1 % (ref 5–12)
NEUTROPHILS # BLD AUTO: 3.27 10*3/MM3 (ref 1.7–7)
NEUTROPHILS NFR BLD AUTO: 63 % (ref 42.7–76)
NRBC BLD AUTO-RTO: 0 /100 WBC (ref 0–0.2)
PLATELET # BLD AUTO: 240 10*3/MM3 (ref 140–450)
RBC # BLD AUTO: 4.78 10*6/MM3 (ref 3.77–5.28)
VIT B12 SERPL-MCNC: 359 PG/ML (ref 211–946)
WBC # BLD AUTO: 5.19 10*3/MM3 (ref 3.4–10.8)

## 2024-12-13 ENCOUNTER — TELEPHONE (OUTPATIENT)
Dept: GASTROENTEROLOGY | Facility: CLINIC | Age: 69
End: 2024-12-13
Payer: MEDICARE

## 2024-12-13 NOTE — TELEPHONE ENCOUNTER
Results have not been sent to the nurses to call, message to Dr Castro to advise on lab results from 12/9

## 2024-12-13 NOTE — TELEPHONE ENCOUNTER
Caller: Coleen Zapien    Relationship: Self    Best call back number: [phone]    What is the best time to reach you: ANYTIME     Who are you requesting to speak with (clinical staff, provider,  specific staff member): CLINICAL STAFF         What was the call regarding: PT IS WANTING TEST RESULTS FROM LABS DRAWN 12/9. PT IS ALSO WANTING TO KNOW IF WE CAN SEND RESULTS TO PCP DEVYN ADAMS.

## 2024-12-16 NOTE — TELEPHONE ENCOUNTER
Called pt and advised pt to check back with us in Jan for appt in 6 months.  Verb understanding.     Also per pt's request, pt's labs forwarded to pcp.

## 2024-12-16 NOTE — TELEPHONE ENCOUNTER
I called her and went over the results of her lab work.  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  I would recommend that she have a repeat EGD in 11 of 2025 to do gastric mapping.  I would recommend that she follow-up in 6 months with one of my partners to discuss having this repeat EGD in 11 of 2025.       Clinical Pool #1: Please schedule this patient to see one of my partners in follow-up in 6 months in the office.

## 2025-03-17 ENCOUNTER — HOSPITAL ENCOUNTER (OUTPATIENT)
Dept: PET IMAGING | Facility: HOSPITAL | Age: 70
Discharge: HOME | End: 2025-03-17
Payer: MEDICARE

## 2025-03-17 DIAGNOSIS — R59.0 MEDIASTINAL LYMPHADENOPATHY: ICD-10-CM

## 2025-03-17 DIAGNOSIS — R91.8 PULMONARY NODULES: ICD-10-CM

## 2025-03-17 LAB — GLUCOSE BLDC GLUCOMTR-MCNC: 81 MG/DL (ref 70–130)

## 2025-03-17 PROCEDURE — 78815 PET IMAGE W/CT SKULL-THIGH: CPT

## 2025-03-17 PROCEDURE — A9552 F18 FDG: HCPCS | Performed by: INTERNAL MEDICINE

## 2025-03-17 PROCEDURE — 82948 REAGENT STRIP/BLOOD GLUCOSE: CPT

## 2025-03-17 PROCEDURE — 34310000005 FLUDEOXYGLUCOSE F18 SOLUTION: Performed by: INTERNAL MEDICINE

## 2025-03-17 RX ADMIN — FLUDEOXYGLUCOSE F 18 1 DOSE: 200 INJECTION, SOLUTION INTRAVENOUS at 12:48

## 2025-03-31 ENCOUNTER — TELEPHONE (OUTPATIENT)
Dept: GASTROENTEROLOGY | Facility: CLINIC | Age: 70
End: 2025-03-31
Payer: MEDICARE

## 2025-03-31 NOTE — TELEPHONE ENCOUNTER
Caller: Coleen Zapien    Relationship: Self    Best call back number: [phone]    What is the best time to reach you: ANYTIME    Who are you requesting to speak with (clinical staff, provider,  specific staff member):       What was the call regarding: PT STATES SOMEONE SHE HAS SPOKEN W/KNOWS WHICH DR THAT DR KAM REF HER TO IN THE PRACTICE AFTER HIS DEPARTURE. PLEASE CONTACT PT TO ASSIST.

## 2025-03-31 NOTE — TELEPHONE ENCOUNTER
"See 12/13/24 encounter from Dr Castro;  \"she follow-up in 6 months with one of my partners to discuss having this repeat EGD in 11 of 2025.\"    You can schedule her with anyone.  "

## 2025-04-22 ENCOUNTER — TRANSCRIBE ORDERS (OUTPATIENT)
Dept: ADMINISTRATIVE | Facility: HOSPITAL | Age: 70
End: 2025-04-22
Payer: MEDICARE

## 2025-04-22 DIAGNOSIS — R91.8 LUNG NODULES: Primary | ICD-10-CM

## 2025-08-07 ENCOUNTER — HOSPITAL ENCOUNTER (OUTPATIENT)
Facility: HOSPITAL | Age: 70
Discharge: HOME | End: 2025-08-07
Admitting: INTERNAL MEDICINE
Payer: MEDICARE

## 2025-08-07 DIAGNOSIS — R91.8 LUNG NODULES: ICD-10-CM

## 2025-08-07 PROCEDURE — 71250 CT THORAX DX C-: CPT

## (undated) DEVICE — CANN O2 ETCO2 FITS ALL CONN CO2 SMPL A/ 7IN DISP LF

## (undated) DEVICE — Device: Brand: DEFENDO AIR/WATER/SUCTION AND BIOPSY VALVE

## (undated) DEVICE — LN SMPL CO2 SHTRM SD STREAM W/M LUER

## (undated) DEVICE — SINGLE-USE BIOPSY FORCEPS: Brand: RADIAL JAW 4

## (undated) DEVICE — ADAPT CLN BIOGUARD AIR/H2O DISP

## (undated) DEVICE — KT ORCA ORCAPOD DISP STRL

## (undated) DEVICE — TUBING, SUCTION, 1/4" X 10', STRAIGHT: Brand: MEDLINE

## (undated) DEVICE — BLCK/BITE BLOX W/DENTL/RIM W/STRAP 54F

## (undated) DEVICE — BITEBLOCK OMNI BLOC

## (undated) DEVICE — SENSR O2 OXIMAX FNGR A/ 18IN NONSTR

## (undated) DEVICE — CANNULA,ADULT,SOFT-TOUCH,7'TUBE,UC: Brand: PENDING

## (undated) DEVICE — THE TORRENT IRRIGATION SCOPE CONNECTOR IS USED WITH THE TORRENT IRRIGATION TUBING TO PROVIDE IRRIGATION FLUIDS SUCH AS STERILE WATER DURING GASTROINTESTINAL ENDOSCOPIC PROCEDURES WHEN USED IN CONJUNCTION WITH AN IRRIGATION PUMP (OR ELECTROSURGICAL UNIT).: Brand: TORRENT